# Patient Record
Sex: FEMALE | Race: WHITE | NOT HISPANIC OR LATINO | Employment: OTHER | URBAN - METROPOLITAN AREA
[De-identification: names, ages, dates, MRNs, and addresses within clinical notes are randomized per-mention and may not be internally consistent; named-entity substitution may affect disease eponyms.]

---

## 2017-04-06 ENCOUNTER — HISTORICAL (OUTPATIENT)
Dept: ADMINISTRATIVE | Facility: HOSPITAL | Age: 67
End: 2017-04-06

## 2017-04-25 LAB
ALBUMIN SERPL-MCNC: 3.9 G/DL (ref 3.1–4.7)
ALP SERPL-CCNC: 127 IU/L (ref 40–104)
ALT (SGPT): 105 IU/L (ref 3–33)
AST SERPL-CCNC: 61 IU/L (ref 10–40)
BILIRUB SERPL-MCNC: 0.4 MG/DL (ref 0.3–1)
BILIRUBIN DIRECT+TOT PNL SERPL-MCNC: <0.1 MG/DL (ref 0–0.2)
HBA1C MFR BLD: 5.9 % (ref 3.1–6.5)
PROT SERPL-MCNC: 7.6 G/DL (ref 6–8.2)
T4 FREE SP9 P CHAL SERPL-SCNC: 0.59 NG/DL (ref 0.45–1.27)
TSH SERPL DL<=0.005 MIU/L-ACNC: 4.74 ULU/ML (ref 0.3–5.6)

## 2017-09-26 RX ORDER — METOPROLOL SUCCINATE 25 MG/1
1 TABLET, EXTENDED RELEASE ORAL DAILY
COMMUNITY
End: 2019-01-21

## 2017-09-26 RX ORDER — ASPIRIN 325 MG
1 TABLET, DELAYED RELEASE (ENTERIC COATED) ORAL DAILY
COMMUNITY

## 2017-09-26 RX ORDER — TELMISARTAN 40 MG/1
1 TABLET ORAL DAILY
COMMUNITY
End: 2017-09-27 | Stop reason: ALTCHOICE

## 2017-09-26 RX ORDER — CETIRIZINE HYDROCHLORIDE 10 MG/1
1 TABLET ORAL DAILY
COMMUNITY
Start: 2017-03-30

## 2017-09-26 RX ORDER — OXYMETAZOLINE HCL 0.05 %
2 SPRAY, NON-AEROSOL (ML) NASAL DAILY
COMMUNITY
Start: 2017-03-30 | End: 2018-03-27

## 2017-09-27 ENCOUNTER — OFFICE VISIT (OUTPATIENT)
Dept: FAMILY MEDICINE | Facility: CLINIC | Age: 67
End: 2017-09-27
Payer: MEDICARE

## 2017-09-27 VITALS
HEIGHT: 60 IN | DIASTOLIC BLOOD PRESSURE: 63 MMHG | HEART RATE: 54 BPM | BODY MASS INDEX: 35.93 KG/M2 | SYSTOLIC BLOOD PRESSURE: 125 MMHG | OXYGEN SATURATION: 96 % | WEIGHT: 183 LBS | RESPIRATION RATE: 18 BRPM

## 2017-09-27 DIAGNOSIS — R53.83 FATIGUE, UNSPECIFIED TYPE: ICD-10-CM

## 2017-09-27 DIAGNOSIS — K76.0 STEATOSIS OF LIVER: ICD-10-CM

## 2017-09-27 DIAGNOSIS — E11.9 TYPE 2 DIABETES MELLITUS WITHOUT COMPLICATION, WITHOUT LONG-TERM CURRENT USE OF INSULIN: ICD-10-CM

## 2017-09-27 DIAGNOSIS — Z12.11 COLON CANCER SCREENING: ICD-10-CM

## 2017-09-27 DIAGNOSIS — I10 ESSENTIAL HYPERTENSION: Primary | ICD-10-CM

## 2017-09-27 PROBLEM — E78.5 HYPERLIPIDEMIA: Status: ACTIVE | Noted: 2017-09-27

## 2017-09-27 PROBLEM — J30.2 SEASONAL ALLERGIC RHINITIS: Status: ACTIVE | Noted: 2017-09-27

## 2017-09-27 PROBLEM — Z95.1 HISTORY OF CORONARY ARTERY BYPASS SURGERY: Status: ACTIVE | Noted: 2017-09-27

## 2017-09-27 PROBLEM — K80.20 CALCULUS OF GALLBLADDER: Status: ACTIVE | Noted: 2017-09-27

## 2017-09-27 PROBLEM — E66.9 OBESITY WITH BODY MASS INDEX 30 OR GREATER: Status: ACTIVE | Noted: 2017-09-27

## 2017-09-27 PROBLEM — L23.7 CONTACT DERMATITIS DUE TO POISON IVY: Status: ACTIVE | Noted: 2017-09-27

## 2017-09-27 PROBLEM — R73.09 ELEVATED HEMOGLOBIN A1C: Status: ACTIVE | Noted: 2017-09-27

## 2017-09-27 PROBLEM — H10.10 ALLERGIC CONJUNCTIVITIS: Status: ACTIVE | Noted: 2017-09-27

## 2017-09-27 PROBLEM — R79.89 ABNORMAL LIVER FUNCTION TESTS: Status: ACTIVE | Noted: 2017-09-27

## 2017-09-27 PROCEDURE — 99214 OFFICE O/P EST MOD 30 MIN: CPT | Mod: ,,, | Performed by: INTERNAL MEDICINE

## 2017-09-27 PROCEDURE — 1126F AMNT PAIN NOTED NONE PRSNT: CPT | Mod: ,,, | Performed by: INTERNAL MEDICINE

## 2017-09-27 PROCEDURE — 1159F MED LIST DOCD IN RCRD: CPT | Mod: ,,, | Performed by: INTERNAL MEDICINE

## 2017-09-27 RX ORDER — LOSARTAN POTASSIUM 25 MG/1
25 TABLET ORAL DAILY
COMMUNITY
End: 2019-01-21

## 2017-09-27 NOTE — PROGRESS NOTES
Subjective:       Patient ID: Elena Wells is a 67 y.o. female.    Chief Complaint: Follow-up (6 mon f/u?)    Patient is here for follow-up for her borderline diabetes, fatty liver, and hypertension. She has a history of CABG. She is complaining of her pulse rate going low and being symptomatic. It doesn't happen often and she is interested in decreasing her Toprol or possibly getting rid of it. She will see her cardiologist next month and he A her to have a CMP as well as a lipid profile. We will add to that hemoglobin A1c as well as a CBC and TSH.    Patient is interested in weight loss and we discussed an exercise program for her and some diet tips. She knows if she loses some weight around her abdominal area that her liver enzymes will improve.      Review of Systems   Constitutional: Negative for activity change, appetite change, chills, diaphoresis, fatigue, fever and unexpected weight change.   HENT: Negative for congestion, ear pain, mouth sores, postnasal drip, sinus pressure, sore throat and trouble swallowing.    Eyes: Negative for pain, redness and visual disturbance.   Respiratory: Negative for apnea, cough, chest tightness, shortness of breath and wheezing.    Cardiovascular: Negative for chest pain, palpitations and leg swelling.   Gastrointestinal: Negative for abdominal distention, abdominal pain, blood in stool, constipation, diarrhea, nausea and vomiting.   Endocrine: Negative for cold intolerance, polydipsia, polyphagia and polyuria.   Genitourinary: Negative for difficulty urinating, dysuria, flank pain, frequency, hematuria, menstrual problem, pelvic pain and urgency.   Musculoskeletal: Negative for arthralgias, back pain, joint swelling and neck pain.   Skin: Negative for color change, rash and wound.   Neurological: Negative for dizziness, tremors, seizures, syncope, weakness, light-headedness, numbness and headaches.   Hematological: Negative for adenopathy. Does not bruise/bleed  easily.   Psychiatric/Behavioral: Negative for confusion, decreased concentration, dysphoric mood, hallucinations, self-injury, sleep disturbance and suicidal ideas. The patient is not nervous/anxious.        Past Medical History:   Diagnosis Date    Allergy     Diabetes mellitus, type 2        Past Surgical History:   Procedure Laterality Date    APPENDECTOMY      CARDIAC SURGERY      HYSTERECTOMY         Family History   Problem Relation Age of Onset    Diabetes Mother     Arthritis Mother     Hypertension Mother     Cancer Father     COPD Father        Social History     Social History    Marital status:      Spouse name: N/A    Number of children: N/A    Years of education: N/A     Social History Main Topics    Smoking status: Never Smoker    Smokeless tobacco: Never Used    Alcohol use No    Drug use: No    Sexual activity: No     Other Topics Concern    None     Social History Narrative    None       Current Outpatient Prescriptions   Medication Sig Dispense Refill    aspirin (ECOTRIN) 325 MG EC tablet Take 1 tablet by mouth once daily.      evolocumab (REPATHA SURECLICK) 140 mg/mL PnIj Inject 1 Dose into the skin every 14 (fourteen) days.      losartan (COZAAR) 25 MG tablet Take 25 mg by mouth once daily.      metoprolol succinate (TOPROL-XL) 25 MG 24 hr tablet Take 1 tablet by mouth once daily.      oxymetazoline (12 HOUR NASAL SPRAY) 0.05 % nasal spray 2 sprays by Nasal route once daily.      cetirizine (ZYRTEC) 10 MG tablet Take 1 tablet by mouth once daily.       No current facility-administered medications for this visit.        Review of patient's allergies indicates:  No Known Allergies  Objective:      Physical Exam   Constitutional: She is oriented to person, place, and time. She appears well-developed and well-nourished.   HENT:   Head: Normocephalic and atraumatic.   Eyes: EOM are normal. Pupils are equal, round, and reactive to light.   Neck: Neck supple. No JVD  present.   Cardiovascular: Normal rate and regular rhythm.  Exam reveals no gallop and no friction rub.    No murmur heard.  Pulmonary/Chest: Effort normal and breath sounds normal. She has no wheezes. She has no rales.   Abdominal: Soft. Bowel sounds are normal. She exhibits no distension. There is no tenderness.   Musculoskeletal: She exhibits no edema or tenderness.   Lymphadenopathy:     She has no cervical adenopathy.   Neurological: She is alert and oriented to person, place, and time. She has normal reflexes. No cranial nerve deficit.   Skin: No rash noted. No erythema.   Psychiatric: She has a normal mood and affect.   Nursing note and vitals reviewed.      Assessment:       1. Essential hypertension    2. Type 2 diabetes mellitus without complication, without long-term current use of insulin    3. Steatosis of liver    4. Colon cancer screening    5. Fatigue, unspecified type        Plan:       Essential hypertension  -     TSH; Future; Expected date: 09/27/2017  -     T4, free; Future; Expected date: 09/27/2017    Type 2 diabetes mellitus without complication, without long-term current use of insulin-diet controlled  -     Hemoglobin A1c; Future; Expected date: 09/27/2017    Steatosis of liver-followed by dr easley     Colon cancer screening-will be done in the near future- her first one    Fatigue, unspecified type  -     CBC auto differential; Future; Expected date: 09/27/2017

## 2017-09-27 NOTE — PATIENT INSTRUCTIONS
Understanding Carbohydrates, Fats, and Protein  Food is a source of fuel and nourishment for your body. Its also a source of pleasure. Having diabetes doesnt mean you have to eat special foods or give up desserts. Instead, your dietitian can show you how to plan meals to suit your body. To start, learn how different foods affect blood sugar.  Carbohydrates  Carbohydrates are the main source of fuel for the body. Carbohydrates raise blood sugar. Many people think carbohydrates are only found in pasta or bread. But carbohydrates are actually in many kinds of foods:  · Sugars occur naturally in foods such as fruit, milk, honey, and molasses. Sugars can also be added to many foods, from cereals and yogurt to candy and desserts. Sugars raise blood sugar.  · Starches are found in bread, cereals, pasta, and dried beans. Theyre also found in corn, peas, potatoes, yam, acorn squash, and butternut squash. Starches also raise blood sugar.   · Fiber is found in foods such as vegetables, fruits, beans, and whole grains. Unlike other carbs, fiber isnt digested or absorbed. So it doesnt raise blood sugar. In fact, fiber can help keep blood sugar from rising too fast. It also helps keep blood cholesterol at a healthy level.  Did you know?  Even though carbohydrates raise blood sugar, its best to have some in every meal. They are an important part of a healthy diet.   Fat  Fat is an energy source that can be stored until needed. Fat does not raise blood sugar. However, it can raise blood cholesterol, increasing the risk of heart disease. Fat is also high in calories, which can cause weight gain. Not all types of fat are the same.  More Healthy:  · Monounsaturated fats are mostly found in vegetable oils, such as olive, canola, and peanut oils. They are also found in avocados and some nuts. Monounsaturated fats are healthy for your heart. Thats because they lower LDL (unhealthy) cholesterol.  · Polyunsaturated fats are mostly  found in vegetable oils, such as corn, safflower, and soybean oils. They are also found in some seeds, nuts, and fish. Polyunsaturated fats lower LDL (unhealthy) cholesterol. So, choosing them instead of saturated fats is healthy for your heart. Certain unsaturated fats can help lower triglycerides.   Less Healthy:  · Saturated fats are found in animal products, such as meat, poultry, whole milk, lard, and butter. Saturated fats raise LDL cholesterol and are not healthy for your heart.  · Hydrogenated oils and trans fats are formed when vegetable oils are processed into solid fats. They are found in many processed foods. Hydrogenated oils and trans fats raise LDL cholesterol and lower HDL (healthy) cholesterol. They are not healthy for your heart.  Protein  Protein helps the body build and repair muscle and other tissue. Protein has little or no effect on blood sugar. However, many foods that contain protein also contain saturated fat. By choosing low-fat protein sources, you can get the benefits of protein without the extra fat:  · Plant protein is found in dry beans and peas, nuts, and soy products, such as tofu and soymilk. These sources tend to be cholesterol-free and low in saturated fat.  · Animal protein is found in fish, poultry, meat, cheese, milk, and eggs. These contain cholesterol and can be high in saturated fat. Aim for lean, lower-fat choices.  Date Last Reviewed: 3/1/2016  © 9734-5474 [employer]. [address] [place] [address], [place] [address]. All rights reserved. This information is not intended as a substitute for professional medical care. Always follow your healthcare professional's instructions.

## 2017-10-18 LAB
ALBUMIN SERPL-MCNC: 3.9 G/DL (ref 3.1–4.7)
ALP SERPL-CCNC: 123 IU/L (ref 40–104)
ALT (SGPT): 93 IU/L (ref 3–33)
AST SERPL-CCNC: 65 IU/L (ref 10–40)
BASOPHILS NFR BLD: 0.1 K/UL (ref 0–0.2)
BASOPHILS NFR BLD: 0.9 %
BILIRUB SERPL-MCNC: 0.5 MG/DL (ref 0.3–1)
BUN SERPL-MCNC: 21 MG/DL (ref 8–20)
CALCIUM SERPL-MCNC: 9.3 MG/DL (ref 7.7–10.4)
CHLORIDE: 104 MMOL/L (ref 98–110)
CO2 SERPL-SCNC: 26.1 MMOL/L (ref 22.8–31.6)
CREATININE: 1.25 MG/DL (ref 0.6–1.4)
EOSINOPHIL NFR BLD: 0.2 K/UL (ref 0–0.7)
EOSINOPHIL NFR BLD: 1.8 %
ERYTHROCYTE [DISTWIDTH] IN BLOOD BY AUTOMATED COUNT: 13.3 % (ref 11.7–14.9)
GLUCOSE: 103 MG/DL (ref 70–99)
GRAN #: 4.4 K/UL (ref 1.4–6.5)
GRAN%: 53.3 %
HBA1C MFR BLD: 5.9 % (ref 3.1–6.5)
HCT VFR BLD AUTO: 41.3 % (ref 36–48)
HGB BLD-MCNC: 13.4 G/DL (ref 12–15)
IMMATURE GRANS (ABS): 0 K/UL (ref 0–1)
IMMATURE GRANULOCYTES: 0.2 %
LYMPH #: 3 K/UL (ref 1.2–3.4)
LYMPH%: 37.1 %
MCH RBC QN AUTO: 30.2 PG (ref 25–35)
MCHC RBC AUTO-ENTMCNC: 32.4 G/DL (ref 31–36)
MCV RBC AUTO: 93 FL (ref 79–98)
MONO #: 0.6 K/UL (ref 0.1–0.6)
MONO%: 6.7 %
NUCLEATED RBCS: 0 %
PLATELET # BLD AUTO: 267 K/UL (ref 140–440)
PMV BLD AUTO: 11.8 FL (ref 8.8–12.7)
POTASSIUM SERPL-SCNC: 4.2 MMOL/L (ref 3.5–5)
PROT SERPL-MCNC: 7.6 G/DL (ref 6–8.2)
RBC # BLD AUTO: 4.44 M/UL (ref 3.5–5.5)
SODIUM: 140 MMOL/L (ref 134–144)
T4 FREE SP9 P CHAL SERPL-SCNC: 0.48 NG/DL (ref 0.45–1.27)
TSH SERPL DL<=0.005 MIU/L-ACNC: 6.26 ULU/ML (ref 0.3–5.6)
WBC # BLD AUTO: 8.2 K/UL (ref 5–10)

## 2017-10-19 DIAGNOSIS — E03.9 HYPOTHYROIDISM, UNSPECIFIED TYPE: Primary | ICD-10-CM

## 2017-10-19 RX ORDER — LEVOTHYROXINE SODIUM 25 UG/1
25 TABLET ORAL DAILY
Qty: 30 TABLET | Refills: 11 | Status: SHIPPED | OUTPATIENT
Start: 2017-10-19 | End: 2018-10-19 | Stop reason: SDUPTHER

## 2017-10-20 ENCOUNTER — TELEPHONE (OUTPATIENT)
Dept: FAMILY MEDICINE | Facility: CLINIC | Age: 67
End: 2017-10-20

## 2017-10-20 NOTE — TELEPHONE ENCOUNTER
----- Message from Adry Mccarthy MD sent at 10/19/2017  8:20 AM CDT -----  All her labs looked good except of course her liver enzymes were elevated but stable AND she is starting to become low on thyroid - I will escribe a supplement and repeat labs in two months for the thyroid.  It needs to be taken on an empty stomach and then not eat for 30 min

## 2017-10-20 NOTE — TELEPHONE ENCOUNTER
Notified pt. She got script from pharmacy & I mailed out the lab orders to her to do in 2 months. She verbalized understanding

## 2018-01-09 LAB
T4 FREE SP9 P CHAL SERPL-SCNC: 0.66 NG/DL (ref 0.45–1.27)
TSH SERPL DL<=0.005 MIU/L-ACNC: 3.65 ULU/ML (ref 0.3–5.6)

## 2018-01-18 ENCOUNTER — TELEPHONE (OUTPATIENT)
Dept: FAMILY MEDICINE | Facility: CLINIC | Age: 68
End: 2018-01-18

## 2018-01-18 NOTE — TELEPHONE ENCOUNTER
----- Message from Adry Mccarthy MD sent at 1/10/2018  7:06 AM CST -----  Thyroid looks good him continue the same supplemental dose. We'll see her in a few months.

## 2018-03-27 ENCOUNTER — OFFICE VISIT (OUTPATIENT)
Dept: FAMILY MEDICINE | Facility: CLINIC | Age: 68
End: 2018-03-27
Payer: MEDICARE

## 2018-03-27 VITALS
HEIGHT: 60 IN | HEART RATE: 96 BPM | OXYGEN SATURATION: 96 % | SYSTOLIC BLOOD PRESSURE: 138 MMHG | WEIGHT: 185.5 LBS | BODY MASS INDEX: 36.42 KG/M2 | RESPIRATION RATE: 16 BRPM | DIASTOLIC BLOOD PRESSURE: 78 MMHG

## 2018-03-27 DIAGNOSIS — K80.20 CALCULUS OF GALLBLADDER WITHOUT CHOLECYSTITIS WITHOUT OBSTRUCTION: ICD-10-CM

## 2018-03-27 DIAGNOSIS — Z79.4 TYPE 2 DIABETES MELLITUS WITHOUT COMPLICATION, WITH LONG-TERM CURRENT USE OF INSULIN: ICD-10-CM

## 2018-03-27 DIAGNOSIS — E03.9 ACQUIRED HYPOTHYROIDISM: ICD-10-CM

## 2018-03-27 DIAGNOSIS — I10 ESSENTIAL HYPERTENSION: Primary | ICD-10-CM

## 2018-03-27 DIAGNOSIS — E11.9 TYPE 2 DIABETES MELLITUS WITHOUT COMPLICATION, WITH LONG-TERM CURRENT USE OF INSULIN: ICD-10-CM

## 2018-03-27 DIAGNOSIS — Z95.1 HISTORY OF CORONARY ARTERY BYPASS SURGERY: ICD-10-CM

## 2018-03-27 DIAGNOSIS — E78.00 PURE HYPERCHOLESTEROLEMIA: ICD-10-CM

## 2018-03-27 DIAGNOSIS — Z12.11 COLON CANCER SCREENING: ICD-10-CM

## 2018-03-27 LAB — HBA1C MFR BLD: 6.1 %

## 2018-03-27 PROCEDURE — 99214 OFFICE O/P EST MOD 30 MIN: CPT | Mod: ,,, | Performed by: INTERNAL MEDICINE

## 2018-03-27 PROCEDURE — 83036 HEMOGLOBIN GLYCOSYLATED A1C: CPT | Mod: QW,,, | Performed by: INTERNAL MEDICINE

## 2018-03-27 RX ORDER — FLUTICASONE PROPIONATE 50 MCG
1 SPRAY, SUSPENSION (ML) NASAL DAILY
COMMUNITY
End: 2019-07-02

## 2018-03-27 NOTE — PATIENT INSTRUCTIONS
Possible Gallstone with Biliary Colic (Presumed)    Your abdominal pain is may be due to spasm of the gallbladder. This is called gallbladder or biliary colic. The gallbladder is a small sac under the liver, which stores and releases bile. Bile is a fluid that aids in the digestion of fat. Eating fatty food stimulates the gallbladder to contract, and release the bile. A gallstone may form in this sac. Although most people do not have symptoms, when the stone moves and blocks the passage of bile out of the bladder, it can cause pain and even an infection.  To be more certain of the diagnosis, you may need to have an ultrasound, CT-scan or other special test.  A number of things increase the risk for developing gallstones:  · Women are more likely  · Obesity  · Increasing age  · Losing or gaining weight quickly  · High calorie diet  · Pregnancy  · Hormone therapy  · Diabetes  The most common symptoms are:  · Abdominal pain, cramping, aching  · Nausea, vomiting  · Fever  Many illnesses can cause these symptoms. This pain usually starts in the upper right side of your abdomen. Sometimes it can radiate to your right shoulder, back and arm. It usually starts suddenly, becomes more intense quickly, and then gradually decreases and disappears over a couple of hours. Elderly people and diabetics may have difficulty showing where the pain is exactly.  Home care  · Rest in bed and follow a clear liquid diet until feeling better. If pain or nausea medicine was given to help with your symptoms, take these as directed.  · You can take acetaminophen or ibuprofen for pain, unless you were given a different pain medicine to use.Note: If you have chronic liver or kidney disease or ever had a stomach ulcer or GI bleeding or are taking blood thinner medications, talk with your doctor before using these medicines.  · Fat in your diet makes the gallbladder contract and may cause increased pain. Therefore, avoid fat in your diet over  the next two days and follow a low-fat diet after that. If you are overweight, a low fat diet will help you lose weight.  Follow-up care  If a test was already scheduled for you, keep this appointment. Be sure you know how to prepare yourself for the test. Usually, you will be asked not to eat or drink anything for at least 8 hours before the test. Schedule an appointment with your own doctor after your test is complete to discuss the findings.  When to seek medical advice  Call your healthcare provider if any of the following occur:  · Pain gets worse or moves to the right lower abdomen  · Repeated vomiting  · Swelling of the abdomen  · Pain lasts over 6 hours  · Fever of 100.4º F (38º C) or higher, or as directed by your healthcare provider  · Weakness, dizziness  · Dark urine or light colored stools  · Yellow color of the skin or eyes  · Chest, arm, back, neck or jaw pain  Call 911  Get emergency medical care right away if any of these occur:  · Trouble breathing  · Very confused  · Very drowsy or trouble awakening  · Fainting or loss of consciousness  · Rapid heart rate  Date Last Reviewed: 8/23/2015  © 2054-7144 [employer]. [address] [place] [address], [place] [address]. All rights reserved. This information is not intended as a substitute for professional medical care. Always follow your healthcare professional's instructions.

## 2018-03-27 NOTE — PROGRESS NOTES
Subjective:       Patient ID: Elena Wells is a 67 y.o. female.    Chief Complaint: Follow-up (6 month f/u)    67-year-old female who is here for four-month follow-up for hypertension, hypothyroidism as well as nonalcoholic fatty liver disease which she's had a whole workup including a liver biopsy. We have discussed the new medication,Hepaxa and she is interested in it. She also has diet-controlled diabetes and hesitant to go on metformin to her fatty liver disease. She does not have a family history of colon cancer nor personal history of colon polyps in his interest in the color guard DNAs to test. On recent imaging she had gallstones and these are present on prior imaging test as well and we did discuss the symptoms of symptomatic gallstones and if she has an attack and with her having diabetes she most likely should get a laparoscopic cholecystectomy.      Review of Systems   Constitutional: Negative for activity change, appetite change, chills, diaphoresis, fatigue, fever and unexpected weight change.   HENT: Negative for congestion, ear pain, mouth sores, postnasal drip, sinus pressure, sore throat and trouble swallowing.    Eyes: Negative for pain, redness and visual disturbance.   Respiratory: Negative for apnea, cough, chest tightness, shortness of breath and wheezing.    Cardiovascular: Negative for chest pain, palpitations and leg swelling.   Gastrointestinal: Negative for abdominal distention, abdominal pain, blood in stool, constipation, diarrhea, nausea and vomiting.   Endocrine: Negative for cold intolerance, polydipsia, polyphagia and polyuria.   Genitourinary: Negative for difficulty urinating, dysuria, flank pain, frequency, hematuria, menstrual problem, pelvic pain and urgency.   Musculoskeletal: Negative for arthralgias, back pain, joint swelling and neck pain.   Skin: Negative for color change, rash and wound.   Neurological: Negative for dizziness, tremors, seizures, syncope, weakness,  light-headedness, numbness and headaches.   Hematological: Negative for adenopathy. Does not bruise/bleed easily.   Psychiatric/Behavioral: Negative for confusion, decreased concentration, dysphoric mood, hallucinations, self-injury, sleep disturbance and suicidal ideas. The patient is not nervous/anxious.        Past Medical History:   Diagnosis Date    Allergy     Diabetes mellitus, type 2        Past Surgical History:   Procedure Laterality Date    APPENDECTOMY      CARDIAC SURGERY      HYSTERECTOMY         Family History   Problem Relation Age of Onset    Diabetes Mother     Arthritis Mother     Hypertension Mother     Cancer Father     COPD Father        Social History     Social History    Marital status:      Spouse name: N/A    Number of children: N/A    Years of education: N/A     Social History Main Topics    Smoking status: Never Smoker    Smokeless tobacco: Never Used    Alcohol use No    Drug use: No    Sexual activity: No     Other Topics Concern    None     Social History Narrative    None       Current Outpatient Prescriptions   Medication Sig Dispense Refill    aspirin (ECOTRIN) 325 MG EC tablet Take 1 tablet by mouth once daily.      evolocumab (REPATHA SURECLICK) 140 mg/mL PnIj Inject 1 Dose into the skin every 14 (fourteen) days.      fluticasone (FLONASE) 50 mcg/actuation nasal spray 1 spray by Each Nare route once daily.      levothyroxine (SYNTHROID) 25 MCG tablet Take 1 tablet (25 mcg total) by mouth once daily. 30 tablet 11    losartan (COZAAR) 25 MG tablet Take 25 mg by mouth once daily.      metoprolol succinate (TOPROL-XL) 25 MG 24 hr tablet Take 1 tablet by mouth once daily.      cetirizine (ZYRTEC) 10 MG tablet Take 1 tablet by mouth once daily.       No current facility-administered medications for this visit.        Review of patient's allergies indicates:  No Known Allergies  Objective:      Physical Exam   Constitutional: She is oriented to person,  place, and time. She appears well-developed and well-nourished.   HENT:   Head: Normocephalic and atraumatic.   Eyes: Right eye exhibits no discharge. Left eye exhibits no discharge. No scleral icterus.   Neck: Neck supple. No JVD present.   Cardiovascular: Normal rate and regular rhythm.  Exam reveals no gallop and no friction rub.    No murmur heard.  Pulmonary/Chest: Effort normal and breath sounds normal. She has no wheezes. She has no rales.   Abdominal: Soft. Bowel sounds are normal. She exhibits no distension. There is no tenderness.   Musculoskeletal: She exhibits no edema or tenderness.   Lymphadenopathy:     She has no cervical adenopathy.   Neurological: She is alert and oriented to person, place, and time. She has normal reflexes. No cranial nerve deficit.   Skin: No rash noted. No erythema.   Psychiatric: She has a normal mood and affect.   Nursing note and vitals reviewed.      Lab Results   Component Value Date    WBC 8.2 10/18/2017    HGB 13.4 10/18/2017    HCT 41.3 10/18/2017     10/18/2017    AST 65 (H) 10/18/2017     10/18/2017    K 4.2 10/18/2017     10/18/2017    CREATININE 1.25 10/18/2017    BUN 21 (H) 10/18/2017    CO2 26.1 10/18/2017    TSH 3.65 01/09/2018    HGBA1C 6.1 03/27/2018     Assessment:       1. Essential hypertension    2. Pure hypercholesterolemia    3. Type 2 diabetes mellitus without complication, with long-term current use of insulin    4. Acquired hypothyroidism    5. Calculus of gallbladder without cholecystitis without obstruction    6. History of coronary artery bypass surgery    7. Colon cancer screening        Plan:       Essential hypertension-continue cozaar and toprol    Pure hypercholesterolemia on repatha    Type 2 diabetes mellitus without complication, with long-term current use of insulin  -     Hemoglobin A1C, POCT    Acquired hypothyroidism  -     T4, free; Future; Expected date: 03/27/2018  -     TSH; Future; Expected date:  03/27/2018    Calculus of gallbladder without cholecystitis without obstruction - reviewed symptoms for an acute biliary attack    Colon cancer screening-cologuard    Time spent with the patient was 40 min and 50 percent of that was in face to face contact

## 2018-04-18 LAB
ALBUMIN SERPL-MCNC: 3.9 G/DL (ref 3.1–4.7)
ALP SERPL-CCNC: 143 IU/L (ref 40–104)
ALT (SGPT): 150 IU/L (ref 3–33)
AST SERPL-CCNC: 93 IU/L (ref 10–40)
BILIRUB SERPL-MCNC: 0.7 MG/DL (ref 0.3–1)
BUN SERPL-MCNC: 22 MG/DL (ref 8–20)
CALCIUM SERPL-MCNC: 9.3 MG/DL (ref 7.7–10.4)
CHLORIDE: 105 MMOL/L (ref 98–110)
CO2 SERPL-SCNC: 26.7 MMOL/L (ref 22.8–31.6)
CREATININE: 1.32 MG/DL (ref 0.6–1.4)
GLUCOSE: 107 MG/DL (ref 70–99)
POTASSIUM SERPL-SCNC: 4.4 MMOL/L (ref 3.5–5)
PROT SERPL-MCNC: 8.1 G/DL (ref 6–8.2)
SODIUM: 138 MMOL/L (ref 134–144)
T4 FREE SP9 P CHAL SERPL-SCNC: 0.61 NG/DL (ref 0.45–1.27)
TSH SERPL DL<=0.005 MIU/L-ACNC: 3.65 ULU/ML (ref 0.3–5.6)

## 2018-04-24 DIAGNOSIS — K75.81 NASH (NONALCOHOLIC STEATOHEPATITIS): ICD-10-CM

## 2018-04-24 DIAGNOSIS — N28.9 ACUTE RENAL INSUFFICIENCY: Primary | ICD-10-CM

## 2018-04-24 DIAGNOSIS — E11.9 TYPE 2 DIABETES MELLITUS WITHOUT COMPLICATION, WITHOUT LONG-TERM CURRENT USE OF INSULIN: ICD-10-CM

## 2018-04-30 ENCOUNTER — TELEPHONE (OUTPATIENT)
Dept: FAMILY MEDICINE | Facility: CLINIC | Age: 68
End: 2018-04-30

## 2018-04-30 NOTE — TELEPHONE ENCOUNTER
----- Message from Adry Mccarthy MD sent at 4/24/2018 12:29 PM CDT -----  Labs back and her kidney function is a little more compromised than last lab work. Her liver enzymes are a little higher as well but I am not so concerned with this     Let us recheck kidney function, non fasting with hydration, recheck her Aic and check for protein in her urine at the end of June and have her make an ov for the beginning of July to review

## 2018-04-30 NOTE — TELEPHONE ENCOUNTER
Pt notified. Labs faxed to [facility] for pt to do labs in June.

## 2018-06-28 ENCOUNTER — TELEPHONE (OUTPATIENT)
Dept: FAMILY MEDICINE | Facility: CLINIC | Age: 68
End: 2018-06-28

## 2018-06-28 NOTE — TELEPHONE ENCOUNTER
----- Message from Shagufta Edmonds sent at 6/28/2018  8:44 AM CDT -----  Can you please request eye exam? Thanks!!

## 2018-06-28 NOTE — TELEPHONE ENCOUNTER
Attempted to notify pt she is ovedue for Eye exam. No answer, LVM to call back and let us know if she got one and where so we can request records.  If not we can give her a referral at her 7/18 appt.

## 2018-07-03 LAB
ALBUMIN SERPL-MCNC: 3.6 G/DL (ref 3.1–4.7)
ALP SERPL-CCNC: 125 IU/L (ref 40–104)
ALT (SGPT): 79 IU/L (ref 3–33)
AST SERPL-CCNC: 55 IU/L (ref 10–40)
BASOPHILS NFR BLD: 0.1 K/UL (ref 0–0.2)
BASOPHILS NFR BLD: 0.9 %
BILIRUB SERPL-MCNC: 0.7 MG/DL (ref 0.3–1)
BUN SERPL-MCNC: 18 MG/DL (ref 8–20)
CALCIUM SERPL-MCNC: 9.2 MG/DL (ref 7.7–10.4)
CHLORIDE: 106 MMOL/L (ref 98–110)
CO2 SERPL-SCNC: 25.8 MMOL/L (ref 22.8–31.6)
CREATININE RANDOM URINE: 103 MG/DL
CREATININE: 1.12 MG/DL (ref 0.6–1.4)
EOSINOPHIL NFR BLD: 0.2 K/UL (ref 0–0.7)
EOSINOPHIL NFR BLD: 2.2 %
ERYTHROCYTE [DISTWIDTH] IN BLOOD BY AUTOMATED COUNT: 13.3 % (ref 11.7–14.9)
GLUCOSE: 104 MG/DL (ref 70–99)
GRAN #: 3.6 K/UL (ref 1.4–6.5)
GRAN%: 48.9 %
HBA1C MFR BLD: 6.1 % (ref 3.1–6.5)
HCT VFR BLD AUTO: 42.5 % (ref 36–48)
HGB BLD-MCNC: 13.9 G/DL (ref 12–15)
IMMATURE GRANS (ABS): 0 K/UL (ref 0–1)
IMMATURE GRANULOCYTES: 0.3 %
LYMPH #: 3 K/UL (ref 1.2–3.4)
LYMPH%: 40 %
MCH RBC QN AUTO: 30.6 PG (ref 25–35)
MCHC RBC AUTO-ENTMCNC: 32.7 G/DL (ref 31–36)
MCV RBC AUTO: 93.6 FL (ref 79–98)
MICROALBUM.,U,RANDOM: 3.5 MCG/ML (ref 0–19.9)
MICROALBUMIN/CREATININE RATIO: 3 (ref 0–30)
MONO #: 0.6 K/UL (ref 0.1–0.6)
MONO%: 7.7 %
NUCLEATED RBCS: 0 %
PLATELET # BLD AUTO: 255 K/UL (ref 140–440)
PMV BLD AUTO: 11.7 FL (ref 8.8–12.7)
POTASSIUM SERPL-SCNC: 4.4 MMOL/L (ref 3.5–5)
PROT SERPL-MCNC: 7.7 G/DL (ref 6–8.2)
RBC # BLD AUTO: 4.54 M/UL (ref 3.5–5.5)
SODIUM: 138 MMOL/L (ref 134–144)
WBC # BLD AUTO: 7.4 K/UL (ref 5–10)

## 2018-07-09 ENCOUNTER — TELEPHONE (OUTPATIENT)
Dept: FAMILY MEDICINE | Facility: CLINIC | Age: 68
End: 2018-07-09

## 2018-07-09 RX ORDER — CIPROFLOXACIN 500 MG/1
500 TABLET ORAL 2 TIMES DAILY
Qty: 10 TABLET | Refills: 0 | Status: SHIPPED | OUTPATIENT
Start: 2018-07-09 | End: 2019-01-21

## 2018-07-09 NOTE — TELEPHONE ENCOUNTER
----- Message from Adry Mccarthy MD sent at 7/9/2018  7:03 AM CDT -----  She has the beginnings of a UTI- all the rest of her lab work looks good/stable     I will escribe an abx

## 2018-07-18 ENCOUNTER — OFFICE VISIT (OUTPATIENT)
Dept: FAMILY MEDICINE | Facility: CLINIC | Age: 68
End: 2018-07-18
Payer: MEDICARE

## 2018-07-18 VITALS
OXYGEN SATURATION: 96 % | DIASTOLIC BLOOD PRESSURE: 80 MMHG | WEIGHT: 184.31 LBS | HEART RATE: 57 BPM | BODY MASS INDEX: 36.18 KG/M2 | HEIGHT: 60 IN | SYSTOLIC BLOOD PRESSURE: 138 MMHG | RESPIRATION RATE: 16 BRPM

## 2018-07-18 DIAGNOSIS — K75.81 NASH (NONALCOHOLIC STEATOHEPATITIS): ICD-10-CM

## 2018-07-18 DIAGNOSIS — E03.9 ACQUIRED HYPOTHYROIDISM: ICD-10-CM

## 2018-07-18 DIAGNOSIS — H25.9 SENILE CATARACT OF LEFT EYE, UNSPECIFIED AGE-RELATED CATARACT TYPE: ICD-10-CM

## 2018-07-18 DIAGNOSIS — I10 ESSENTIAL HYPERTENSION: Primary | ICD-10-CM

## 2018-07-18 PROCEDURE — 90732 PPSV23 VACC 2 YRS+ SUBQ/IM: CPT | Mod: ,,, | Performed by: INTERNAL MEDICINE

## 2018-07-18 PROCEDURE — G0009 ADMIN PNEUMOCOCCAL VACCINE: HCPCS | Mod: ,,, | Performed by: INTERNAL MEDICINE

## 2018-07-18 PROCEDURE — 99214 OFFICE O/P EST MOD 30 MIN: CPT | Mod: ,,, | Performed by: INTERNAL MEDICINE

## 2018-07-18 NOTE — PATIENT INSTRUCTIONS
Pneumococcal Vaccination  There are 2 pneumococcal vaccinations that protect people against pneumococcal disease.  Pneumococcal disease  Pneumococcal disease is caused by bacteria (Streptococcus pneumoniae). This germ is easily spread when someone with the bacteria coughs, sneezes, laughs, or talks. You can get pneumococcal disease more than once. This is because there are many different types (strains) of the bacteria. Some strains are also resistant to treatment with antibiotics.  There are different kinds of pneumococcal disease, depending on what part of the body is infected. They include:  · Pneumonia. Infection in the lungs.  · Meningitis. Infection of the covering of the brain and spinal cord.  · Otitis media. Infection of the middle ear.  · Bacteremia or septicemia. Infection in the blood.  Pneumococcal disease can be life-threatening, especially for people in high-risk groups. Each year, thousands of people die of this disease. Thousands more become seriously ill.  The vaccine    The pneumococcal vaccines are the best way to avoid pneumococcal disease. They are safe and effective. The vaccines are given as shots (injections). This can be done at your healthcare provider's office or a health clinic. Drugstores, senior centers, and workplaces often offer vaccinations, too. If you have questions, ask your healthcare provider.  The pneumococcal vaccines are recommended for:  · Persons 65 and older  · Infants  · People with chronic health problems (such as diabetes, chronic lung or heart disease, liver disease)  · People who have a cochlear implant  · People who have weakened immune systems  · People who live in nursing homes or other long-term care facilities  · People who smoke or have asthma  They are given:  · In a 4-dose series in infants  · One time in adults; some people need a second dose of one of the vaccines  Your healthcare provider can tell you more about the vaccines, whether you should get them,  and the number of shots you should get.  Date Last Reviewed: 11/1/2016  © 4196-1262 [employer]. [address] [place] [address], [place] [address]. All rights reserved. This information is not intended as a substitute for professional medical care. Always follow your healthcare professional's instructions.

## 2018-07-18 NOTE — PROGRESS NOTES
Subjective:       Patient ID: Elena Wells is a 68 y.o. female.    Chief Complaint: Follow-up (6 month f/u) and Hypertension    60-year-old female who has a past medical history for Parra, dyslipidemia with coronary artery disease, allergic rhinitis and hypertension who also has insulin resistance and acquired hypothyroidism. She is here for four-month checkup on her liver enzymes and she is on Hepaxa for almost 2 months now and her liver enzymes are almost cut in half. These would include her to transaminases and to some degree she saw a mild decrease in alkaline phosphatase. Patient also feels much better      Review of Systems   Constitutional: Negative for activity change, appetite change, chills, diaphoresis, fatigue, fever and unexpected weight change.   HENT: Negative for congestion, ear pain, mouth sores, postnasal drip, sinus pressure, sore throat and trouble swallowing.    Eyes: Negative for pain, redness and visual disturbance.   Respiratory: Negative for apnea, cough, chest tightness, shortness of breath and wheezing.    Cardiovascular: Negative for chest pain, palpitations and leg swelling.   Gastrointestinal: Negative for abdominal distention, abdominal pain, blood in stool, constipation, diarrhea, nausea and vomiting.   Endocrine: Negative for cold intolerance, polydipsia, polyphagia and polyuria.   Genitourinary: Negative for difficulty urinating, dysuria, flank pain, frequency, hematuria, menstrual problem, pelvic pain and urgency.   Musculoskeletal: Negative for arthralgias, back pain, joint swelling and neck pain.   Skin: Negative for color change, rash and wound.   Neurological: Negative for dizziness, tremors, seizures, syncope, weakness, light-headedness, numbness and headaches.   Hematological: Negative for adenopathy. Does not bruise/bleed easily.   Psychiatric/Behavioral: Negative for confusion, decreased concentration, dysphoric mood, hallucinations, self-injury, sleep disturbance  and suicidal ideas. The patient is not nervous/anxious.        Past Medical History:   Diagnosis Date    Allergy     Diabetes mellitus, type 2        Past Surgical History:   Procedure Laterality Date    APPENDECTOMY      CARDIAC SURGERY      HYSTERECTOMY         Family History   Problem Relation Age of Onset    Diabetes Mother     Arthritis Mother     Hypertension Mother     Cancer Father     COPD Father        Social History     Social History    Marital status:      Spouse name: N/A    Number of children: N/A    Years of education: N/A     Social History Main Topics    Smoking status: Never Smoker    Smokeless tobacco: Never Used    Alcohol use No    Drug use: No    Sexual activity: No     Other Topics Concern    None     Social History Narrative    None       Current Outpatient Prescriptions   Medication Sig Dispense Refill    aspirin (ECOTRIN) 325 MG EC tablet Take 1 tablet by mouth once daily.      cetirizine (ZYRTEC) 10 MG tablet Take 1 tablet by mouth once daily.      ciprofloxacin HCl (CIPRO) 500 MG tablet Take 1 tablet (500 mg total) by mouth 2 (two) times daily. 10 tablet 0    evolocumab (REPATHA SURECLICK) 140 mg/mL PnIj Inject 1 Dose into the skin every 14 (fourteen) days.      fluticasone (FLONASE) 50 mcg/actuation nasal spray 1 spray by Each Nare route once daily.      levothyroxine (SYNTHROID) 25 MCG tablet Take 1 tablet (25 mcg total) by mouth once daily. 30 tablet 11    losartan (COZAAR) 25 MG tablet Take 25 mg by mouth once daily.      metoprolol succinate (TOPROL-XL) 25 MG 24 hr tablet Take 1 tablet by mouth once daily.       No current facility-administered medications for this visit.        Review of patient's allergies indicates:  No Known Allergies  Objective:      Physical Exam   Constitutional: She is oriented to person, place, and time. She appears well-developed and well-nourished.   HENT:   Head: Normocephalic and atraumatic.   Eyes: Right eye exhibits  no discharge. Left eye exhibits no discharge. No scleral icterus.   Neck: Neck supple. No JVD present.   Cardiovascular: Normal rate and regular rhythm.  Exam reveals no gallop and no friction rub.    No murmur heard.  Pulmonary/Chest: Effort normal and breath sounds normal. She has no wheezes. She has no rales.   Abdominal: Soft. Bowel sounds are normal. She exhibits no distension. There is no tenderness.   Musculoskeletal: She exhibits no edema or tenderness.   Lymphadenopathy:     She has no cervical adenopathy.   Neurological: She is alert and oriented to person, place, and time. She has normal reflexes. No cranial nerve deficit.   Skin: No rash noted. No erythema.   Psychiatric: She has a normal mood and affect.   Nursing note and vitals reviewed.      Lab Results   Component Value Date    WBC 7.4 07/03/2018    HGB 13.9 07/03/2018    HCT 42.5 07/03/2018     07/03/2018    AST 55 (H) 07/03/2018     07/03/2018    K 4.4 07/03/2018     07/03/2018    CREATININE 1.12 07/03/2018    BUN 18 07/03/2018    CO2 25.8 07/03/2018    TSH 3.65 04/18/2018    HGBA1C 6.1 07/03/2018     Assessment:       1. Essential hypertension    2. WATERS (nonalcoholic steatohepatitis)    3. Acquired hypothyroidism    4. Senile cataract of left eye, unspecified age-related cataract type    5. BMI 36.0-36.9,adult        Plan:       Essential hypertension-Controlled on above med regimen to include an ARB/ACE    WATERS (nonalcoholic steatohepatitis)-improved on hepaxa  -     Comprehensive metabolic panel; Future; Expected date: 11/12/2018    Acquired hypothyroidism  -     TSH; Future; Expected date: 11/12/2018  -     T4, free; Future; Expected date: 11/12/2018    Senile cataract of left eye, unspecified age-related cataract type  -     Ambulatory referral to Ophthalmology    BMI 36.0-36.9,adult-trial of adiposano    Other orders  -     Pneumococcal Polysaccharide Vaccine (23 Valent) (SQ/IM)

## 2018-07-25 ENCOUNTER — TELEPHONE (OUTPATIENT)
Dept: FAMILY MEDICINE | Facility: CLINIC | Age: 68
End: 2018-07-25

## 2018-10-04 ENCOUNTER — TELEPHONE (OUTPATIENT)
Dept: FAMILY MEDICINE | Facility: CLINIC | Age: 68
End: 2018-10-04

## 2018-10-04 NOTE — TELEPHONE ENCOUNTER
----- Message from Shagufta Edmonds sent at 10/4/2018  3:15 PM CDT -----  Can you please request eye exam from Dr. Cerrato office? Thanks!!

## 2018-10-19 DIAGNOSIS — E03.9 HYPOTHYROIDISM, UNSPECIFIED TYPE: ICD-10-CM

## 2018-10-21 RX ORDER — LEVOTHYROXINE SODIUM 25 UG/1
TABLET ORAL
Qty: 30 TABLET | Refills: 1 | Status: SHIPPED | OUTPATIENT
Start: 2018-10-21 | End: 2018-12-21 | Stop reason: SDUPTHER

## 2018-12-03 LAB
ALBUMIN SERPL-MCNC: 3.7 G/DL (ref 3.1–4.7)
ALP SERPL-CCNC: 133 IU/L (ref 40–104)
ALT (SGPT): 70 IU/L (ref 3–33)
AST SERPL-CCNC: 46 IU/L (ref 10–40)
BILIRUB SERPL-MCNC: 0.8 MG/DL (ref 0.3–1)
BUN SERPL-MCNC: 24 MG/DL (ref 8–20)
CALCIUM SERPL-MCNC: 9.7 MG/DL (ref 7.7–10.4)
CHLORIDE: 103 MMOL/L (ref 98–110)
CO2 SERPL-SCNC: 27.2 MMOL/L (ref 22.8–31.6)
CREATININE: 1.18 MG/DL (ref 0.6–1.4)
GLUCOSE: 111 MG/DL (ref 70–99)
POTASSIUM SERPL-SCNC: 4.5 MMOL/L (ref 3.5–5)
PROT SERPL-MCNC: 8.3 G/DL (ref 6–8.2)
SODIUM: 140 MMOL/L (ref 134–144)
T4 FREE SP9 P CHAL SERPL-SCNC: 0.63 NG/DL (ref 0.45–1.27)
TSH SERPL DL<=0.005 MIU/L-ACNC: 4.88 ULU/ML (ref 0.3–5.6)

## 2018-12-21 DIAGNOSIS — E03.9 HYPOTHYROIDISM, UNSPECIFIED TYPE: ICD-10-CM

## 2018-12-26 RX ORDER — LEVOTHYROXINE SODIUM 25 UG/1
TABLET ORAL
Qty: 30 TABLET | Refills: 1 | Status: SHIPPED | OUTPATIENT
Start: 2018-12-26 | End: 2019-01-21

## 2018-12-27 ENCOUNTER — TELEPHONE (OUTPATIENT)
Dept: FAMILY MEDICINE | Facility: CLINIC | Age: 68
End: 2018-12-27

## 2019-01-21 ENCOUNTER — OFFICE VISIT (OUTPATIENT)
Dept: FAMILY MEDICINE | Facility: CLINIC | Age: 69
End: 2019-01-21
Payer: MEDICARE

## 2019-01-21 VITALS
DIASTOLIC BLOOD PRESSURE: 80 MMHG | RESPIRATION RATE: 16 BRPM | BODY MASS INDEX: 35.34 KG/M2 | HEIGHT: 60 IN | OXYGEN SATURATION: 97 % | WEIGHT: 180 LBS | HEART RATE: 50 BPM | SYSTOLIC BLOOD PRESSURE: 136 MMHG

## 2019-01-21 DIAGNOSIS — E03.9 ACQUIRED HYPOTHYROIDISM: ICD-10-CM

## 2019-01-21 DIAGNOSIS — R00.1 BRADYCARDIA, SINUS: ICD-10-CM

## 2019-01-21 DIAGNOSIS — H25.9 SENILE CATARACT OF LEFT EYE, UNSPECIFIED AGE-RELATED CATARACT TYPE: ICD-10-CM

## 2019-01-21 DIAGNOSIS — E11.9 TYPE 2 DIABETES MELLITUS WITHOUT COMPLICATION, WITHOUT LONG-TERM CURRENT USE OF INSULIN: ICD-10-CM

## 2019-01-21 DIAGNOSIS — R77.8 ELEVATED TOTAL PROTEIN: ICD-10-CM

## 2019-01-21 DIAGNOSIS — Z01.818 PRE-OPERATIVE CLEARANCE: Primary | ICD-10-CM

## 2019-01-21 DIAGNOSIS — I10 ESSENTIAL HYPERTENSION: ICD-10-CM

## 2019-01-21 DIAGNOSIS — K75.81 NASH (NONALCOHOLIC STEATOHEPATITIS): ICD-10-CM

## 2019-01-21 DIAGNOSIS — E78.00 PURE HYPERCHOLESTEROLEMIA: ICD-10-CM

## 2019-01-21 PROCEDURE — G0008 ADMIN INFLUENZA VIRUS VAC: HCPCS | Mod: ,,, | Performed by: INTERNAL MEDICINE

## 2019-01-21 PROCEDURE — 99215 OFFICE O/P EST HI 40 MIN: CPT | Mod: 25,,, | Performed by: INTERNAL MEDICINE

## 2019-01-21 PROCEDURE — 99215 PR OFFICE/OUTPT VISIT, EST, LEVL V, 40-54 MIN: ICD-10-PCS | Mod: 25,,, | Performed by: INTERNAL MEDICINE

## 2019-01-21 PROCEDURE — G0008 FLU VACCINE - HIGH DOSE (65+) PRESERVATIVE FREE IM: ICD-10-PCS | Mod: ,,, | Performed by: INTERNAL MEDICINE

## 2019-01-21 PROCEDURE — 90662 IIV NO PRSV INCREASED AG IM: CPT | Mod: ,,, | Performed by: INTERNAL MEDICINE

## 2019-01-21 PROCEDURE — 90662 FLU VACCINE - HIGH DOSE (65+) PRESERVATIVE FREE IM: ICD-10-PCS | Mod: ,,, | Performed by: INTERNAL MEDICINE

## 2019-01-21 RX ORDER — LEVOTHYROXINE SODIUM 50 UG/1
50 TABLET ORAL DAILY
Qty: 90 TABLET | Refills: 3 | Status: SHIPPED | OUTPATIENT
Start: 2019-01-21 | End: 2020-02-07

## 2019-01-21 RX ORDER — LOSARTAN POTASSIUM 50 MG/1
50 TABLET ORAL DAILY
Qty: 90 TABLET | Refills: 3 | Status: SHIPPED | OUTPATIENT
Start: 2019-01-21 | End: 2020-02-24

## 2019-01-21 NOTE — PATIENT INSTRUCTIONS
A1C  Does this test have other names?  Hemoglobin A1c; HbA1c; glycosylated hemoglobin; glycohemoglobin; Glycated hemoglobin  What is this test?  A1C is a blood test used to screen people to find out whether they have diabetes or prediabetes. It's also used in people who know they have diabetes to measure how well they are controlling their blood sugar and to guide their treatment decisions over time.  Why do I need this test?  You may need this test to check for prediabetes or diabetes. If you already know that you have diabetes or prediabetes, you may need this test to see how well you are controlling your blood sugar.  People with diabetes must track their blood sugar (glucose) levels every day to make sure they arent too high or too low. The A1C test gives results for a longer period of time. It shows whether your blood sugar has been too high on average in the previous two to three months. When blood sugar is high, more glucose builds up and sticks to your hemoglobin, a protein that carries oxygen in red blood cells. The A1C test measures the percentage of hemoglobin that is coated with blood sugar.  Depending on the type of diabetes you have, how well it's controlled, and your health care providers preferences, you may need to have the A1C test two or more times a year. The American Diabetes Association (ADA) recommends that you have an A1C test at least twice a year if you are meeting your blood sugar goals and your blood sugar is well-controlled. If you arent meeting your goals or your medication has changed lately, you should have the A1C test more often. You also may have the test when your health care provider first starts working with you to treat your diabetes.  What other tests might I have along with this test?   If your health care provider is testing you for diabetes, you may also take a fasting plasma glucose test, or FPG, or an oral glucose tolerance test, or OGTT, as part of your screening  and diagnosis. You may also be tested for sugar, ketones or protein in the urine.  What do my test results mean?  Laboratory test results may vary depending on your age, gender, health history, the method used for the test, and many other factors. If your results are different from the results suggested below, this may not mean that you have a problem. Ask your health care provider to explain what the results mean for you.   A1C is reported as a percentage:  · Normal A1C is considered to be below 5.7 percent. Results between 5.7 and 6.4 percent may mean you have prediabetes. This means you have a higher risk of developing diabetes.  · Results of 6.5 percent or higher on two separate occasions may mean that you have diabetes.  · The ADA  recommends that people with diabetes should maintain an A1C below 7 percent. The American Association of Clinical Endocrinologists recommends an A1C of 6.5 percent or less. Recommendations may vary based on the person's age, medical conditions, or other factors.   How is this test done?  The test requires a blood sample, which is drawn through a needle from a vein in your arm.   Does this test pose any risks?  Taking a blood sample with a needle carries risks that include bleeding, infection, bruising, and a sense of lightheadedness. When the needle pricks your arm, you may feel a slight stinging sensation or pain. Afterward, the site may be slightly sore.  What might affect my test results?  Your blood sugar levels usually vary throughout the day. These variations won't affect the A1C.  If you have sickle cell anemia or other blood disorders, a standard A1C test may be less useful for diagnosing or monitoring diabetes. (Your health care provider may be able to find another diabetes test that will better serve you.) In addition, the test results may be skewed if you have anemia, heavy bleeding, an iron deficiency, kidney failure, or liver disease.  How do I get ready for this  test?  You don't need any special preparation for the test.    © 6777-8975 [employer]. [address] [place] [address], [place] [address]. All rights reserved. This information is not intended as a substitute for professional medical care. Always follow your healthcare professional's instructions.

## 2019-01-22 NOTE — PROGRESS NOTES
Subjective:       Patient ID: Elena Wells is a 68 y.o. female.    Chief Complaint: Pre-op Exam    68-year-old female is here for clearance for cataract surgery and she also has a history of Parra and is on Hepatza 4 g a day for over 9 months now. She had some blood work done before this office visit as well. She has lost 6 pounds in the past few months. She is doing healthy eating and developed outdoors remodeling. Her liver enzymes continued to decrease mostly seen in her transaminases. Now her AST is 75 ALT is 45. She also is on a path the for cholesterol. Lipid profile was fair and she will see her cardiologist next month as well. She cannot tolerate statins especially in view of her Parra. Her heart rate is on the low side and she is on Toprol 25 mg extended release. Is also on losartan 25. She also is a borderline diabetic and with his last labs we did not check an A1c with the intentions of Tequin here in the office with the machine is out of strips.      Review of Systems   Constitutional: Negative for activity change, appetite change, chills, diaphoresis, fatigue, fever and unexpected weight change.   HENT: Negative for congestion, ear pain, mouth sores, postnasal drip, sinus pressure, sore throat and trouble swallowing.    Eyes: Negative for pain, redness and visual disturbance.   Respiratory: Negative for apnea, cough, chest tightness, shortness of breath and wheezing.    Cardiovascular: Negative for chest pain, palpitations and leg swelling.   Gastrointestinal: Negative for abdominal distention, abdominal pain, blood in stool, constipation, diarrhea, nausea and vomiting.   Endocrine: Negative for cold intolerance, polydipsia, polyphagia and polyuria.   Genitourinary: Negative for difficulty urinating, dysuria, flank pain, frequency, hematuria, menstrual problem, pelvic pain and urgency.   Musculoskeletal: Negative for arthralgias, back pain, joint swelling and neck pain.   Skin: Negative for color  change, rash and wound.   Neurological: Negative for dizziness, tremors, seizures, syncope, weakness, light-headedness, numbness and headaches.   Hematological: Negative for adenopathy. Does not bruise/bleed easily.   Psychiatric/Behavioral: Negative for confusion, decreased concentration, dysphoric mood, hallucinations, self-injury, sleep disturbance and suicidal ideas. The patient is not nervous/anxious.        Past Medical History:   Diagnosis Date    Allergy     Diabetes mellitus, type 2        Past Surgical History:   Procedure Laterality Date    APPENDECTOMY      CARDIAC SURGERY      HYSTERECTOMY         Family History   Problem Relation Age of Onset    Diabetes Mother     Arthritis Mother     Hypertension Mother     Cancer Father     COPD Father        Social History     Socioeconomic History    Marital status:      Spouse name: None    Number of children: None    Years of education: None    Highest education level: None   Social Needs    Financial resource strain: None    Food insecurity - worry: None    Food insecurity - inability: None    Transportation needs - medical: None    Transportation needs - non-medical: None   Occupational History    None   Tobacco Use    Smoking status: Never Smoker    Smokeless tobacco: Never Used   Substance and Sexual Activity    Alcohol use: No    Drug use: No    Sexual activity: No   Other Topics Concern    None   Social History Narrative    None       Current Outpatient Medications   Medication Sig Dispense Refill    aspirin (ECOTRIN) 325 MG EC tablet Take 1 tablet by mouth once daily.      cetirizine (ZYRTEC) 10 MG tablet Take 1 tablet by mouth once daily.      evolocumab (REPATHA SURECLICK) 140 mg/mL PnIj Inject 1 Dose into the skin every 14 (fourteen) days.      fluticasone (FLONASE) 50 mcg/actuation nasal spray 1 spray by Each Nare route once daily.      levothyroxine (SYNTHROID) 50 MCG tablet Take 1 tablet (50 mcg total) by  mouth once daily. 90 tablet 3    losartan (COZAAR) 50 MG tablet Take 1 tablet (50 mg total) by mouth once daily. 90 tablet 3     No current facility-administered medications for this visit.        Review of patient's allergies indicates:  No Known Allergies  Objective:      Physical Exam   Constitutional: She is oriented to person, place, and time. She appears well-developed and well-nourished.   HENT:   Head: Normocephalic and atraumatic.   Eyes: Right eye exhibits no discharge. Left eye exhibits no discharge. No scleral icterus.   Neck: Neck supple. No JVD present.   Cardiovascular: Normal rate and regular rhythm. Exam reveals no gallop and no friction rub.   No murmur heard.  Pulmonary/Chest: Effort normal and breath sounds normal. She has no wheezes. She has no rales.   Abdominal: Soft. Bowel sounds are normal. She exhibits no distension. There is no tenderness.   Musculoskeletal: She exhibits no edema or tenderness.   Lymphadenopathy:     She has no cervical adenopathy.   Neurological: She is alert and oriented to person, place, and time. She has normal reflexes. No cranial nerve deficit.   Skin: No rash noted. No erythema.   Psychiatric: She has a normal mood and affect.   Nursing note and vitals reviewed.      Lab Results   Component Value Date    WBC 7.4 07/03/2018    HGB 13.9 07/03/2018    HCT 42.5 07/03/2018     07/03/2018    AST 46 (H) 12/03/2018     12/03/2018    K 4.5 12/03/2018     12/03/2018    CREATININE 1.18 12/03/2018    BUN 24 (H) 12/03/2018    CO2 27.2 12/03/2018    TSH 4.88 12/03/2018    HGBA1C 6.1 07/03/2018       Assessment:       1. Pre-operative clearance    2. Senile cataract of left eye, unspecified age-related cataract type    3. Type 2 diabetes mellitus without complication, without long-term current use of insulin    4. Pure hypercholesterolemia    5. Essential hypertension    6. Acquired hypothyroidism    7. Bradycardia, sinus    8. Elevated total protein    9. WATERS  (nonalcoholic steatohepatitis)        Plan:       Pre-operative clearance  for  Senile cataract of left eye, unspecified age-related cataract type  Patient is cleared for cataract surgery    Type 2 diabetes mellitus without complication, without long-term current use of insulin  -     Cancel: Hemoglobin A1c; Future; Expected date: 05/20/2019    Pure hypercholesterolemia- continue rapatha and follow with cards next week    Essential hypertension  -     losartan (COZAAR) 50 MG tablet; Take 1 tablet (50 mg total) by mouth once daily.  Dispense: 90 tablet; Refill: 3  -     Comprehensive metabolic panel; Future; Expected date: 05/20/2019    Acquired hypothyroidism  -     TSH; Future; Expected date: 05/20/2019  -     T4, free; Future; Expected date: 05/20/2019  -     levothyroxine (SYNTHROID) 50 MCG tablet; Take 1 tablet (50 mcg total) by mouth once daily.  Dispense: 90 tablet; Refill: 3    Bradycardia, sinus    Elevated total protein  -     Comprehensive metabolic panel; Future; Expected date: 05/20/2019  -     CBC auto differential; Future; Expected date: 05/20/2019    WATERS (nonalcoholic steatohepatitis)    Improving on hepaxa    Other orders  -     Influenza - High Dose (65+) (PF) (IM)

## 2019-01-27 ENCOUNTER — TELEPHONE (OUTPATIENT)
Dept: FAMILY MEDICINE | Facility: CLINIC | Age: 69
End: 2019-01-27

## 2019-01-27 NOTE — TELEPHONE ENCOUNTER
Sanjuana please print out my progress note from 121 and fax over with the clearance for cataract surgery to dr curran

## 2019-05-06 LAB
ALBUMIN SERPL-MCNC: 3.7 G/DL (ref 3.1–4.7)
ALP SERPL-CCNC: 120 IU/L (ref 40–104)
ALT (SGPT): 67 IU/L (ref 3–33)
AST SERPL-CCNC: 50 IU/L (ref 10–40)
BASOPHILS NFR BLD: 0.1 K/UL (ref 0–0.2)
BASOPHILS NFR BLD: 0.8 %
BILIRUB SERPL-MCNC: 0.8 MG/DL (ref 0.3–1)
BUN SERPL-MCNC: 21 MG/DL (ref 8–20)
CALCIUM SERPL-MCNC: 9.6 MG/DL (ref 7.7–10.4)
CHLORIDE: 102 MMOL/L (ref 98–110)
CO2 SERPL-SCNC: 25.7 MMOL/L (ref 22.8–31.6)
CREATININE: 1.2 MG/DL (ref 0.6–1.4)
EOSINOPHIL NFR BLD: 0.2 K/UL (ref 0–0.7)
EOSINOPHIL NFR BLD: 1.7 %
ERYTHROCYTE [DISTWIDTH] IN BLOOD BY AUTOMATED COUNT: 13.3 % (ref 11.7–14.9)
GLUCOSE: 113 MG/DL (ref 70–99)
GRAN #: 4.1 K/UL (ref 1.4–6.5)
GRAN%: 47.9 %
HBA1C MFR BLD: 5.9 % (ref 3.1–6.5)
HCT VFR BLD AUTO: 45.7 % (ref 36–48)
HGB BLD-MCNC: 14.5 G/DL (ref 12–15)
IMMATURE GRANS (ABS): 0 K/UL (ref 0–1)
IMMATURE GRANULOCYTES: 0.2 %
LYMPH #: 3.6 K/UL (ref 1.2–3.4)
LYMPH%: 42.3 %
MCH RBC QN AUTO: 29.9 PG (ref 25–35)
MCHC RBC AUTO-ENTMCNC: 31.7 G/DL (ref 31–36)
MCV RBC AUTO: 94.2 FL (ref 79–98)
MONO #: 0.6 K/UL (ref 0.1–0.6)
MONO%: 7.1 %
NUCLEATED RBCS: 0 %
PLATELET # BLD AUTO: 272 K/UL (ref 140–440)
PMV BLD AUTO: 12 FL (ref 8.8–12.7)
POTASSIUM SERPL-SCNC: 4.2 MMOL/L (ref 3.5–5)
PROT SERPL-MCNC: 7.8 G/DL (ref 6–8.2)
RBC # BLD AUTO: 4.85 M/UL (ref 3.5–5.5)
SODIUM: 137 MMOL/L (ref 134–144)
T4 FREE SP9 P CHAL SERPL-SCNC: 0.62 NG/DL (ref 0.45–1.27)
TSH SERPL DL<=0.005 MIU/L-ACNC: 3.26 ULU/ML (ref 0.3–5.6)
WBC # BLD AUTO: 8.6 K/UL (ref 5–10)

## 2019-05-07 ENCOUNTER — TELEPHONE (OUTPATIENT)
Dept: FAMILY MEDICINE | Facility: CLINIC | Age: 69
End: 2019-05-07

## 2019-05-07 NOTE — TELEPHONE ENCOUNTER
----- Message from Adry Mccarthy MD sent at 5/6/2019  7:06 PM CDT -----  LAB WORK LOOKS PRETTY STABLE - WILL DISCUSS IN FULL ON FOLLOW UP OV NEXT MONTH  LIVER STABLE AND GLUCOSE GOOD

## 2019-05-16 ENCOUNTER — TELEPHONE (OUTPATIENT)
Dept: FAMILY MEDICINE | Facility: CLINIC | Age: 69
End: 2019-05-16

## 2019-07-02 ENCOUNTER — OFFICE VISIT (OUTPATIENT)
Dept: FAMILY MEDICINE | Facility: CLINIC | Age: 69
End: 2019-07-02
Payer: MEDICARE

## 2019-07-02 VITALS
RESPIRATION RATE: 18 BRPM | SYSTOLIC BLOOD PRESSURE: 118 MMHG | WEIGHT: 189.81 LBS | BODY MASS INDEX: 37.27 KG/M2 | OXYGEN SATURATION: 96 % | DIASTOLIC BLOOD PRESSURE: 66 MMHG | HEART RATE: 70 BPM | HEIGHT: 60 IN | TEMPERATURE: 99 F

## 2019-07-02 DIAGNOSIS — E11.9 TYPE 2 DIABETES MELLITUS WITHOUT COMPLICATION, WITHOUT LONG-TERM CURRENT USE OF INSULIN: Primary | ICD-10-CM

## 2019-07-02 DIAGNOSIS — K75.81 NASH (NONALCOHOLIC STEATOHEPATITIS): ICD-10-CM

## 2019-07-02 DIAGNOSIS — I10 ESSENTIAL HYPERTENSION: ICD-10-CM

## 2019-07-02 DIAGNOSIS — E03.9 ACQUIRED HYPOTHYROIDISM: ICD-10-CM

## 2019-07-02 PROCEDURE — 99213 OFFICE O/P EST LOW 20 MIN: CPT | Mod: ,,, | Performed by: INTERNAL MEDICINE

## 2019-07-02 PROCEDURE — 99213 PR OFFICE/OUTPT VISIT, EST, LEVL III, 20-29 MIN: ICD-10-PCS | Mod: ,,, | Performed by: INTERNAL MEDICINE

## 2019-07-03 NOTE — PROGRESS NOTES
Subjective:       Patient ID: Elena Wells is a 69 y.o. female.    Chief Complaint: Hypertension (5 mo f/u ) and Diabetes (lab review)    69-year-old female who is here for a six-month follow-up for dyslipidemia for which she is followed with cardiology for the most part and on Repatha she is statin intolerant, mild hypothyroidism and hypertension. The patient had some blood work done in May and everything looks good. Her hemoglobin A1c which she is prediabetic and diet control was 5.9. She also has Parra and her liver enzymes are fairly stable mostly an elevation in transaminases thyroid functions were normal and the patient is up-to-date on her health maintenance. She is undergoing something at home with her daughter and grandkids but is not requesting anything for sleep or for stress this time.    Review of Systems   Constitutional: Negative for activity change, appetite change, chills, diaphoresis, fatigue, fever and unexpected weight change.   HENT: Negative for congestion, ear pain, mouth sores, postnasal drip, sinus pressure, sore throat and trouble swallowing.    Eyes: Negative for pain, redness and visual disturbance.   Respiratory: Negative for apnea, cough, chest tightness, shortness of breath and wheezing.    Cardiovascular: Negative for chest pain, palpitations and leg swelling.   Gastrointestinal: Negative for abdominal distention, abdominal pain, blood in stool, constipation, diarrhea, nausea and vomiting.   Endocrine: Negative for cold intolerance, polydipsia, polyphagia and polyuria.   Genitourinary: Negative for difficulty urinating, dysuria, flank pain, frequency, hematuria, menstrual problem, pelvic pain and urgency.   Musculoskeletal: Negative for arthralgias, back pain, joint swelling and neck pain.   Skin: Negative for color change, rash and wound.   Neurological: Negative for dizziness, tremors, seizures, syncope, weakness, light-headedness, numbness and headaches.   Hematological:  Negative for adenopathy. Does not bruise/bleed easily.   Psychiatric/Behavioral: Negative for confusion, decreased concentration, dysphoric mood, hallucinations, self-injury, sleep disturbance and suicidal ideas. The patient is not nervous/anxious.        Past Medical History:   Diagnosis Date    Allergy     Diabetes mellitus, type 2        Past Surgical History:   Procedure Laterality Date    APPENDECTOMY      CARDIAC SURGERY      HYSTERECTOMY         Family History   Problem Relation Age of Onset    Diabetes Mother     Arthritis Mother     Hypertension Mother     Cancer Father     COPD Father        Social History     Socioeconomic History    Marital status:      Spouse name: Not on file    Number of children: Not on file    Years of education: Not on file    Highest education level: Not on file   Occupational History    Not on file   Social Needs    Financial resource strain: Not on file    Food insecurity:     Worry: Not on file     Inability: Not on file    Transportation needs:     Medical: Not on file     Non-medical: Not on file   Tobacco Use    Smoking status: Never Smoker    Smokeless tobacco: Never Used   Substance and Sexual Activity    Alcohol use: No    Drug use: No    Sexual activity: Never   Lifestyle    Physical activity:     Days per week: Not on file     Minutes per session: Not on file    Stress: Very much   Relationships    Social connections:     Talks on phone: Not on file     Gets together: Not on file     Attends Mu-ism service: Not on file     Active member of club or organization: Not on file     Attends meetings of clubs or organizations: Not on file     Relationship status: Not on file   Other Topics Concern    Not on file   Social History Narrative    Not on file       Current Outpatient Medications   Medication Sig Dispense Refill    aspirin (ECOTRIN) 325 MG EC tablet Take 1 tablet by mouth once daily.      cetirizine (ZYRTEC) 10 MG tablet Take  1 tablet by mouth once daily.      evolocumab (REPATHA SURECLICK) 140 mg/mL PnIj Inject 1 Dose into the skin every 14 (fourteen) days.      levothyroxine (SYNTHROID) 50 MCG tablet Take 1 tablet (50 mcg total) by mouth once daily. 90 tablet 3    losartan (COZAAR) 50 MG tablet Take 1 tablet (50 mg total) by mouth once daily. 90 tablet 3     No current facility-administered medications for this visit.        Review of patient's allergies indicates:   Allergen Reactions    Nubain [nalbuphine] Other (See Comments)     Drops BP     Objective:      Physical Exam   Constitutional: She is oriented to person, place, and time. She appears well-developed and well-nourished.   HENT:   Head: Normocephalic and atraumatic.   Eyes: Right eye exhibits no discharge. Left eye exhibits no discharge. No scleral icterus.   Neck: Neck supple. No JVD present.   Cardiovascular: Normal rate and regular rhythm. Exam reveals no gallop and no friction rub.   No murmur heard.  Pulmonary/Chest: Effort normal and breath sounds normal. She has no wheezes. She has no rales.   Abdominal: Soft. Bowel sounds are normal. She exhibits no distension. There is no tenderness.   Musculoskeletal: She exhibits no edema or tenderness.   Lymphadenopathy:     She has no cervical adenopathy.   Neurological: She is alert and oriented to person, place, and time. She has normal reflexes. No cranial nerve deficit.   Skin: No rash noted. No erythema.   Psychiatric: She has a normal mood and affect.   Nursing note and vitals reviewed.      Lab Results   Component Value Date    WBC 8.6 05/06/2019    HGB 14.5 05/06/2019    HCT 45.7 05/06/2019     05/06/2019    AST 50 (H) 05/06/2019     05/06/2019    K 4.2 05/06/2019     05/06/2019    CREATININE 1.20 05/06/2019    BUN 21 (H) 05/06/2019    CO2 25.7 05/06/2019    TSH 3.26 05/06/2019    HGBA1C 5.9 05/06/2019       Assessment:       1. Type 2 diabetes mellitus without complication, without long-term  current use of insulin    2. Acquired hypothyroidism    3. WATERS (nonalcoholic steatohepatitis)    4. Essential hypertension        Plan:       Type 2 diabetes mellitus without complication, without long-term current use of insulin   Under good diet control    Acquired hypothyroidism   continue same dose of thyroid supplement    WATERS (nonalcoholic steatohepatitis)   Follows with gastro/liver specialist - stable - avoid alcohol as well    Essential hypertension   Controlled on above med regimen to include an ARB/ACE

## 2019-08-14 ENCOUNTER — TELEPHONE (OUTPATIENT)
Dept: ADMINISTRATIVE | Facility: HOSPITAL | Age: 69
End: 2019-08-14

## 2019-12-30 ENCOUNTER — HOSPITAL ENCOUNTER (OUTPATIENT)
Dept: PREADMISSION TESTING | Facility: HOSPITAL | Age: 69
Discharge: HOME | End: 2019-12-30
Attending: INTERNAL MEDICINE
Payer: MEDICARE

## 2019-12-30 ENCOUNTER — HOSPITAL ENCOUNTER (OUTPATIENT)
Dept: RADIOLOGY | Facility: HOSPITAL | Age: 69
Discharge: HOME | End: 2019-12-30
Attending: INTERNAL MEDICINE
Payer: MEDICARE

## 2019-12-30 VITALS — HEIGHT: 60 IN | WEIGHT: 194.88 LBS | BODY MASS INDEX: 38.26 KG/M2

## 2019-12-30 DIAGNOSIS — Z01.810 PRE-PROCEDURAL CARDIOVASCULAR EXAMINATION: Primary | ICD-10-CM

## 2019-12-30 DIAGNOSIS — R94.39 ABNORMAL STRESS TEST: ICD-10-CM

## 2019-12-30 LAB
ANION GAP SERPL CALC-SCNC: 11 MMOL/L (ref 8–16)
APTT PPP: 33 SEC (ref 23.6–33.3)
BACTERIA #/AREA URNS HPF: ABNORMAL /HPF
BASOPHILS # BLD AUTO: 0.1 K/UL (ref 0–0.2)
BASOPHILS NFR BLD: 1.2 % (ref 0–1.9)
BILIRUB UR QL STRIP: NEGATIVE
BUN SERPL-MCNC: 22 MG/DL (ref 8–23)
CALCIUM SERPL-MCNC: 9.6 MG/DL (ref 8.7–10.5)
CHLORIDE SERPL-SCNC: 103 MMOL/L (ref 95–110)
CLARITY UR: CLEAR
CO2 SERPL-SCNC: 25 MMOL/L (ref 23–29)
COLOR UR: YELLOW
CREAT SERPL-MCNC: 1.1 MG/DL (ref 0.5–1.4)
DIFFERENTIAL METHOD: NORMAL
EOSINOPHIL # BLD AUTO: 0.2 K/UL (ref 0–0.5)
EOSINOPHIL NFR BLD: 1.8 % (ref 0–8)
ERYTHROCYTE [DISTWIDTH] IN BLOOD BY AUTOMATED COUNT: 13.2 % (ref 11.5–14.5)
EST. GFR  (AFRICAN AMERICAN): 59.2 ML/MIN/1.73 M^2
EST. GFR  (NON AFRICAN AMERICAN): 51.3 ML/MIN/1.73 M^2
GLUCOSE SERPL-MCNC: 108 MG/DL (ref 70–110)
GLUCOSE UR QL STRIP: NEGATIVE
HCT VFR BLD AUTO: 44.5 % (ref 37–48.5)
HGB BLD-MCNC: 14.3 G/DL (ref 12–16)
HGB UR QL STRIP: NEGATIVE
IMM GRANULOCYTES # BLD AUTO: 0.02 K/UL (ref 0–0.04)
IMM GRANULOCYTES NFR BLD AUTO: 0.2 % (ref 0–0.5)
INR PPP: 1
KETONES UR QL STRIP: NEGATIVE
LEUKOCYTE ESTERASE UR QL STRIP: ABNORMAL
LYMPHOCYTES # BLD AUTO: 3.5 K/UL (ref 1–4.8)
LYMPHOCYTES NFR BLD: 39.9 % (ref 18–48)
MAGNESIUM SERPL-MCNC: 1.8 MG/DL (ref 1.6–2.6)
MCH RBC QN AUTO: 30.2 PG (ref 27–31)
MCHC RBC AUTO-ENTMCNC: 32.1 G/DL (ref 32–36)
MCV RBC AUTO: 94 FL (ref 82–98)
MICROSCOPIC COMMENT: ABNORMAL
MONOCYTES # BLD AUTO: 0.8 K/UL (ref 0.3–1)
MONOCYTES NFR BLD: 8.9 % (ref 4–15)
NEUTROPHILS # BLD AUTO: 4.2 K/UL (ref 1.8–7.7)
NEUTROPHILS NFR BLD: 48 % (ref 38–73)
NITRITE UR QL STRIP: NEGATIVE
NRBC BLD-RTO: 0 /100 WBC
PH UR STRIP: 6 [PH] (ref 5–8)
PLATELET # BLD AUTO: 254 K/UL (ref 150–350)
PMV BLD AUTO: 11.4 FL (ref 9.2–12.9)
POTASSIUM SERPL-SCNC: 3.9 MMOL/L (ref 3.5–5.1)
PROT UR QL STRIP: NEGATIVE
PROTHROMBIN TIME: 12.7 SEC (ref 10.6–14.8)
RBC # BLD AUTO: 4.74 M/UL (ref 4–5.4)
RBC #/AREA URNS HPF: 1 /HPF (ref 0–4)
SODIUM SERPL-SCNC: 139 MMOL/L (ref 136–145)
SP GR UR STRIP: 1.02 (ref 1–1.03)
URN SPEC COLLECT METH UR: ABNORMAL
UROBILINOGEN UR STRIP-ACNC: NEGATIVE EU/DL
WBC # BLD AUTO: 8.69 K/UL (ref 3.9–12.7)
WBC #/AREA URNS HPF: 2 /HPF (ref 0–5)

## 2019-12-30 PROCEDURE — 85025 COMPLETE CBC W/AUTO DIFF WBC: CPT

## 2019-12-30 PROCEDURE — 36415 COLL VENOUS BLD VENIPUNCTURE: CPT

## 2019-12-30 PROCEDURE — 71046 X-RAY EXAM CHEST 2 VIEWS: CPT | Mod: TC

## 2019-12-30 PROCEDURE — 85730 THROMBOPLASTIN TIME PARTIAL: CPT

## 2019-12-30 PROCEDURE — 80048 BASIC METABOLIC PNL TOTAL CA: CPT

## 2019-12-30 PROCEDURE — 81001 URINALYSIS AUTO W/SCOPE: CPT

## 2019-12-30 PROCEDURE — 83735 ASSAY OF MAGNESIUM: CPT

## 2019-12-30 PROCEDURE — 93005 ELECTROCARDIOGRAM TRACING: CPT

## 2019-12-30 PROCEDURE — 85610 PROTHROMBIN TIME: CPT

## 2019-12-30 NOTE — DISCHARGE INSTRUCTIONS
 Nothing to eat or drink after midnight the night before your procedure.   Do not take any medications the morning of your procedure.   Bring all your medications with you in the original pill bottles from pharmacy.   If you take blood thinners, ask your doctor if you should stop taking them.   Do not take metformin 24 hours prior to your procedure.   Do your Hibiclens wash the night before and morning of your procedure.   If you use a CPAP or BiPAP at home, please bring it with you the day of your procedure.   Make arrangements for someone you know to drive you home after your procedure. Taxi and Uber are not acceptable.     Thursday 01/02/2020 05:30AM

## 2020-01-02 ENCOUNTER — HOSPITAL ENCOUNTER (OUTPATIENT)
Facility: HOSPITAL | Age: 70
Discharge: HOME | End: 2020-01-02
Attending: INTERNAL MEDICINE | Admitting: INTERNAL MEDICINE
Payer: MEDICARE

## 2020-01-02 VITALS
WEIGHT: 194 LBS | BODY MASS INDEX: 37.89 KG/M2 | OXYGEN SATURATION: 96 % | DIASTOLIC BLOOD PRESSURE: 68 MMHG | SYSTOLIC BLOOD PRESSURE: 112 MMHG | HEART RATE: 60 BPM | RESPIRATION RATE: 28 BRPM | TEMPERATURE: 98 F

## 2020-01-02 DIAGNOSIS — R94.39 ABNORMAL STRESS TEST: Primary | ICD-10-CM

## 2020-01-02 LAB
ERYTHROCYTE [DISTWIDTH] IN BLOOD BY AUTOMATED COUNT: 13.2 % (ref 11.5–14.5)
HCT VFR BLD AUTO: 41.3 % (ref 37–48.5)
HGB BLD-MCNC: 13.4 G/DL (ref 12–16)
MCH RBC QN AUTO: 30.2 PG (ref 27–31)
MCHC RBC AUTO-ENTMCNC: 32.4 G/DL (ref 32–36)
MCV RBC AUTO: 93 FL (ref 82–98)
PLATELET # BLD AUTO: 222 K/UL (ref 150–350)
PMV BLD AUTO: 11.1 FL (ref 9.2–12.9)
RBC # BLD AUTO: 4.43 M/UL (ref 4–5.4)
WBC # BLD AUTO: 8.17 K/UL (ref 3.9–12.7)

## 2020-01-02 PROCEDURE — C1887 CATHETER, GUIDING: HCPCS | Performed by: INTERNAL MEDICINE

## 2020-01-02 PROCEDURE — 99152 MOD SED SAME PHYS/QHP 5/>YRS: CPT | Performed by: INTERNAL MEDICINE

## 2020-01-02 PROCEDURE — 25500020 PHARM REV CODE 255: Performed by: INTERNAL MEDICINE

## 2020-01-02 PROCEDURE — 25000003 PHARM REV CODE 250: Performed by: INTERNAL MEDICINE

## 2020-01-02 PROCEDURE — 99153 MOD SED SAME PHYS/QHP EA: CPT | Performed by: INTERNAL MEDICINE

## 2020-01-02 PROCEDURE — 63600175 PHARM REV CODE 636 W HCPCS: Performed by: INTERNAL MEDICINE

## 2020-01-02 PROCEDURE — 93455 CORONARY ART/GRFT ANGIO S&I: CPT

## 2020-01-02 PROCEDURE — C1725 CATH, TRANSLUMIN NON-LASER: HCPCS | Performed by: INTERNAL MEDICINE

## 2020-01-02 PROCEDURE — C1760 CLOSURE DEV, VASC: HCPCS | Performed by: INTERNAL MEDICINE

## 2020-01-02 PROCEDURE — C1894 INTRO/SHEATH, NON-LASER: HCPCS | Performed by: INTERNAL MEDICINE

## 2020-01-02 PROCEDURE — 85027 COMPLETE CBC AUTOMATED: CPT

## 2020-01-02 DEVICE — DEVICE CLOSURE  ANGIO-SEAL 6FR 610130: Type: IMPLANTABLE DEVICE | Site: GROIN | Status: FUNCTIONAL

## 2020-01-02 RX ORDER — SODIUM CHLORIDE 450 MG/100ML
INJECTION, SOLUTION INTRAVENOUS CONTINUOUS
Status: DISCONTINUED | OUTPATIENT
Start: 2020-01-02 | End: 2020-01-02 | Stop reason: HOSPADM

## 2020-01-02 RX ORDER — SODIUM CHLORIDE 450 MG/100ML
INJECTION, SOLUTION INTRAVENOUS
Status: DISCONTINUED | OUTPATIENT
Start: 2020-01-02 | End: 2020-01-02 | Stop reason: HOSPADM

## 2020-01-02 RX ORDER — MIDAZOLAM HYDROCHLORIDE 1 MG/ML
INJECTION INTRAMUSCULAR; INTRAVENOUS
Status: DISCONTINUED | OUTPATIENT
Start: 2020-01-02 | End: 2020-01-02 | Stop reason: HOSPADM

## 2020-01-02 RX ORDER — LANOLIN ALCOHOL/MO/W.PET/CERES
400 CREAM (GRAM) TOPICAL ONCE
Status: COMPLETED | OUTPATIENT
Start: 2020-01-02 | End: 2020-01-02

## 2020-01-02 RX ORDER — ACETAMINOPHEN 325 MG/1
650 TABLET ORAL EVERY 4 HOURS PRN
Status: DISCONTINUED | OUTPATIENT
Start: 2020-01-02 | End: 2020-01-02 | Stop reason: HOSPADM

## 2020-01-02 RX ORDER — POTASSIUM CHLORIDE 750 MG/1
10 CAPSULE, EXTENDED RELEASE ORAL ONCE
Status: COMPLETED | OUTPATIENT
Start: 2020-01-02 | End: 2020-01-02

## 2020-01-02 RX ORDER — ONDANSETRON 2 MG/ML
4 INJECTION INTRAMUSCULAR; INTRAVENOUS EVERY 12 HOURS PRN
Status: DISCONTINUED | OUTPATIENT
Start: 2020-01-02 | End: 2020-01-02 | Stop reason: HOSPADM

## 2020-01-02 RX ORDER — LIDOCAINE HYDROCHLORIDE 10 MG/ML
INJECTION, SOLUTION EPIDURAL; INFILTRATION; INTRACAUDAL; PERINEURAL
Status: DISCONTINUED | OUTPATIENT
Start: 2020-01-02 | End: 2020-01-02 | Stop reason: HOSPADM

## 2020-01-02 RX ORDER — FENTANYL CITRATE 50 UG/ML
INJECTION, SOLUTION INTRAMUSCULAR; INTRAVENOUS
Status: DISCONTINUED | OUTPATIENT
Start: 2020-01-02 | End: 2020-01-02 | Stop reason: HOSPADM

## 2020-01-02 RX ORDER — LORAZEPAM 1 MG/1
1 TABLET ORAL ONCE
Status: COMPLETED | OUTPATIENT
Start: 2020-01-02 | End: 2020-01-02

## 2020-01-02 RX ADMIN — POTASSIUM CHLORIDE 10 MEQ: 750 CAPSULE, EXTENDED RELEASE ORAL at 06:01

## 2020-01-02 RX ADMIN — SODIUM CHLORIDE: 0.45 INJECTION, SOLUTION INTRAVENOUS at 06:01

## 2020-01-02 RX ADMIN — MAGNESIUM OXIDE 400 MG: 400 TABLET ORAL at 06:01

## 2020-01-02 RX ADMIN — LORAZEPAM 1 MG: 1 TABLET ORAL at 06:01

## 2020-01-02 NOTE — PLAN OF CARE
1530 up ambulating in hallway to bathroom no co pain no acute distress noted right groin remains soft and non tender upon palpation

## 2020-01-02 NOTE — DISCHARGE INSTRUCTIONS
Post angiogram discharge care   You have a puncture site in you right groin   You can remove your current dressing in 24 hours and take a shower. Showers only for the next week. Do not sit in a bathtub or pool of water 7 days until the puncture site is healed.    Gently clean the puncture site daily using soap and water while showering, dry thoroughly and cover with a Band-Aid. Make sure the Band-Aid and puncture site stay clean and dry.   Inspect the site daily for tenderness, discharge or signs of infection.    Observe the puncture site for signs of bleeding, obvious oozing, formation on knot under skin, or bruising. If any of these were to occur you should immediately lie down flat and apply firm pressure at the insertion site for 10 minutes. If bleeding or swelling does not stop, call 911! Do NOT try to drive yourself to the hospital.    Drink at least 6-8 glasses of clear liquids durin the next 24 hours to flush out the dye.   You must not be alone for the next 24 hours.    You may experience soreness or tenderness that my last for 1 week.    You may have mild oozing from the puncture site and/or possible bruising that could last up to 2 weeks.   You may also have a small lump form that may last up to 6 weeks.        Activity   Do not drive or operate an automobile or hazardous machinery for 24 hours   Do note engage in sports for 1 week.   Limit lifting over 10 pounds for the nest week, or until puncture site heals.   Limit you activities for the next 48 hours. Avoid excessive bending or squatting.   You may resume normal activity in 2-3 days, let pain be your guide.    Call your Doctor immediatly if you experience any of the following:   Chest pain, palpitations, shortness of breath, excessive dizziness, fainting, nausea or vomiting.   Renato signs of infection: redness, warmth, swelling, pain, drainage (other than a little blood on bandaide), chills, poorly healing puncture site, fever  greater than 101.0 F   Change in color, coolness, swelling, numbness, or pain to the involved extremity   Significant bleeding from the puncture site.

## 2020-01-02 NOTE — Clinical Note
Catheter is repositioned to the ostial  SVG . Angiography performed of the left coronary arteries and right coronary arteries in multiple views. Angiography performed via hand injection with 20 mL of contrast.

## 2020-01-02 NOTE — Clinical Note
Catheter is inserted into the proximal   right coronary artery. Angiography performed of the right coronary arteries in multiple views. Angiography performed via hand injection with 12 mL of contrast.

## 2020-01-02 NOTE — PLAN OF CARE
2732 discharge instructions given to pt and friend verbalized understanding no co pain no acute distress noted right groin dsg cdi , discharged to home via wheelchair with friend

## 2020-01-02 NOTE — Clinical Note
Catheter is inserted into the ostial  left coronary artery. Angiography performed of the left coronary arteries in multiple views. Angiography performed via hand injection with 20 mL of contrast. [id number]

## 2020-01-02 NOTE — Clinical Note
Catheter is repositioned to the ostial  LIMA graft . Angiography performed  in multiple views. Angiography performed via hand injection with 6 mL of contrast.

## 2020-01-03 ENCOUNTER — LAB VISIT (OUTPATIENT)
Dept: LAB | Facility: HOSPITAL | Age: 70
End: 2020-01-03
Attending: INTERNAL MEDICINE
Payer: MEDICARE

## 2020-01-03 DIAGNOSIS — Z98.890 STATUS POST CORONARY ANGIOGRAM: Primary | ICD-10-CM

## 2020-01-03 DIAGNOSIS — R94.39 ABNORMAL BALLISTOCARDIOGRAM: ICD-10-CM

## 2020-01-03 LAB
BASOPHILS # BLD AUTO: 0.07 K/UL (ref 0–0.2)
BASOPHILS NFR BLD: 1 % (ref 0–1.9)
DIFFERENTIAL METHOD: NORMAL
EOSINOPHIL # BLD AUTO: 0.1 K/UL (ref 0–0.5)
EOSINOPHIL NFR BLD: 1.7 % (ref 0–8)
ERYTHROCYTE [DISTWIDTH] IN BLOOD BY AUTOMATED COUNT: 13.2 % (ref 11.5–14.5)
HCT VFR BLD AUTO: 41.7 % (ref 37–48.5)
HGB BLD-MCNC: 13.6 G/DL (ref 12–16)
IMM GRANULOCYTES # BLD AUTO: 0.01 K/UL (ref 0–0.04)
IMM GRANULOCYTES NFR BLD AUTO: 0.1 % (ref 0–0.5)
LYMPHOCYTES # BLD AUTO: 2.7 K/UL (ref 1–4.8)
LYMPHOCYTES NFR BLD: 39.1 % (ref 18–48)
MCH RBC QN AUTO: 30.6 PG (ref 27–31)
MCHC RBC AUTO-ENTMCNC: 32.6 G/DL (ref 32–36)
MCV RBC AUTO: 94 FL (ref 82–98)
MONOCYTES # BLD AUTO: 0.5 K/UL (ref 0.3–1)
MONOCYTES NFR BLD: 7.7 % (ref 4–15)
NEUTROPHILS # BLD AUTO: 3.5 K/UL (ref 1.8–7.7)
NEUTROPHILS NFR BLD: 50.4 % (ref 38–73)
NRBC BLD-RTO: 0 /100 WBC
PLATELET # BLD AUTO: 223 K/UL (ref 150–350)
PMV BLD AUTO: 11.8 FL (ref 9.2–12.9)
RBC # BLD AUTO: 4.45 M/UL (ref 4–5.4)
WBC # BLD AUTO: 7.01 K/UL (ref 3.9–12.7)

## 2020-01-03 PROCEDURE — 85025 COMPLETE CBC W/AUTO DIFF WBC: CPT

## 2020-01-03 PROCEDURE — 36415 COLL VENOUS BLD VENIPUNCTURE: CPT

## 2020-01-08 ENCOUNTER — OFFICE VISIT (OUTPATIENT)
Dept: FAMILY MEDICINE | Facility: CLINIC | Age: 70
End: 2020-01-08
Payer: MEDICARE

## 2020-01-08 VITALS
DIASTOLIC BLOOD PRESSURE: 70 MMHG | TEMPERATURE: 98 F | HEART RATE: 78 BPM | OXYGEN SATURATION: 97 % | SYSTOLIC BLOOD PRESSURE: 122 MMHG | HEIGHT: 60 IN | BODY MASS INDEX: 38.32 KG/M2 | WEIGHT: 195.19 LBS | RESPIRATION RATE: 18 BRPM

## 2020-01-08 DIAGNOSIS — E66.9 CLASS 2 OBESITY WITH BODY MASS INDEX (BMI) OF 38.0 TO 38.9 IN ADULT, UNSPECIFIED OBESITY TYPE, UNSPECIFIED WHETHER SERIOUS COMORBIDITY PRESENT: ICD-10-CM

## 2020-01-08 DIAGNOSIS — I10 ESSENTIAL HYPERTENSION: ICD-10-CM

## 2020-01-08 DIAGNOSIS — Z11.59 ENCOUNTER FOR HEPATITIS C SCREENING TEST FOR LOW RISK PATIENT: ICD-10-CM

## 2020-01-08 DIAGNOSIS — E03.8 OTHER SPECIFIED HYPOTHYROIDISM: ICD-10-CM

## 2020-01-08 DIAGNOSIS — Z13.1 DIABETES MELLITUS SCREENING: ICD-10-CM

## 2020-01-08 DIAGNOSIS — E78.00 PURE HYPERCHOLESTEROLEMIA: Primary | ICD-10-CM

## 2020-01-08 PROCEDURE — 1170F FXNL STATUS ASSESSED: CPT | Mod: ,,, | Performed by: FAMILY MEDICINE

## 2020-01-08 PROCEDURE — 1170F PR FUNCTIONAL STATUS ASSESSED: ICD-10-PCS | Mod: ,,, | Performed by: FAMILY MEDICINE

## 2020-01-08 PROCEDURE — 1159F MED LIST DOCD IN RCRD: CPT | Mod: ,,, | Performed by: FAMILY MEDICINE

## 2020-01-08 PROCEDURE — 99214 OFFICE O/P EST MOD 30 MIN: CPT | Mod: S$PBB,,, | Performed by: FAMILY MEDICINE

## 2020-01-08 PROCEDURE — 1126F PR PAIN SEVERITY QUANTIFIED, NO PAIN PRESENT: ICD-10-PCS | Mod: ,,, | Performed by: FAMILY MEDICINE

## 2020-01-08 PROCEDURE — 1159F PR MEDICATION LIST DOCUMENTED IN MEDICAL RECORD: ICD-10-PCS | Mod: ,,, | Performed by: FAMILY MEDICINE

## 2020-01-08 PROCEDURE — 99214 PR OFFICE/OUTPT VISIT, EST, LEVL IV, 30-39 MIN: ICD-10-PCS | Mod: S$PBB,,, | Performed by: FAMILY MEDICINE

## 2020-01-08 PROCEDURE — 99214 OFFICE O/P EST MOD 30 MIN: CPT | Performed by: FAMILY MEDICINE

## 2020-01-08 PROCEDURE — 1126F AMNT PAIN NOTED NONE PRSNT: CPT | Mod: ,,, | Performed by: FAMILY MEDICINE

## 2020-01-08 NOTE — PROGRESS NOTES
SUBJECTIVE:    Patient ID: Elena Wells is a 69 y.o. female.    Chief Complaint: Hyperlipidemia and Hypertension    70 yo female here today to establish care previously followed by Dr Mccarthy, she has several chronic medical conditions that appear well controlled. She has the diagnosis of DM but is not on medications and her most recent A1C was normal. Pt has no new complaints today and does not need any medications refilled. She will need up to date labs.    SPMHx:  HTN: Losartan 50mg her blood pressure is well controlled today  Hyperlipidemia: Repatha 140mg every 14 days  Hypothyroid: On Synthroid 50mcg has been on the same dose for 6 months  Fatty Liver Disease: Taking Repatha  CAD: 4 vessel bipass in 2012, has had recent angiogram 2020      Specialists  Cardiologist: Dr Coburn      Hyperlipidemia   This is a chronic problem. The current episode started more than 1 year ago. The problem is controlled. Recent lipid tests were reviewed and are normal. Exacerbating diseases include hypothyroidism and liver disease. She has no history of diabetes. There are no known factors aggravating her hyperlipidemia. Pertinent negatives include no chest pain or shortness of breath. Treatments tried: Monoclonal antibody. The current treatment provides moderate improvement of lipids. Compliance problems include adherence to exercise and adherence to diet.  Risk factors for coronary artery disease include dyslipidemia, hypertension, obesity, a sedentary lifestyle and post-menopausal.   Hypertension   This is a chronic problem. The current episode started more than 1 year ago. The problem is controlled. Pertinent negatives include no anxiety, blurred vision, chest pain, headaches, malaise/fatigue, neck pain, orthopnea, palpitations, peripheral edema, PND, shortness of breath or sweats. There are no associated agents to hypertension. Risk factors for coronary artery disease include dyslipidemia, obesity, post-menopausal  state and sedentary lifestyle. Past treatments include angiotensin blockers. The current treatment provides moderate improvement. Compliance problems include exercise and diet.  Identifiable causes of hypertension include a thyroid problem.   Thyroid Problem   Presents for follow-up visit. Patient reports no anxiety, cold intolerance, constipation, depressed mood, diaphoresis, diarrhea, dry skin, fatigue, hair loss, heat intolerance, hoarse voice, leg swelling, nail problem, palpitations, tremors, visual change, weight gain or weight loss. The symptoms have been stable. Her past medical history is significant for hyperlipidemia. There is no history of diabetes.         Past Medical History:   Diagnosis Date    Abnormal stress test     Allergy     CAD (coronary artery disease)     Cataract     Fatty liver     Gall stones     Hypercholesteremia     Hypertension     Hypothyroidism     Sleep apnea      Social History     Socioeconomic History    Marital status:      Spouse name: Not on file    Number of children: Not on file    Years of education: Not on file    Highest education level: Not on file   Occupational History    Not on file   Social Needs    Financial resource strain: Not on file    Food insecurity:     Worry: Not on file     Inability: Not on file    Transportation needs:     Medical: Not on file     Non-medical: Not on file   Tobacco Use    Smoking status: Never Smoker    Smokeless tobacco: Never Used   Substance and Sexual Activity    Alcohol use: No    Drug use: No    Sexual activity: Not on file   Lifestyle    Physical activity:     Days per week: Not on file     Minutes per session: Not on file    Stress: Very much   Relationships    Social connections:     Talks on phone: Not on file     Gets together: Not on file     Attends Evangelical service: Not on file     Active member of club or organization: Not on file     Attends meetings of clubs or organizations: Not on  file     Relationship status: Not on file   Other Topics Concern    Not on file   Social History Narrative    Not on file     Past Surgical History:   Procedure Laterality Date    APPENDECTOMY      CARDIAC CATHETERIZATION      CATARACT EXTRACTION, BILATERAL      CORONARY ANGIOGRAPHY Left 1/2/2020    Procedure: ANGIOGRAM, CORONARY ARTERY;  Surgeon: Kuldeep Mooer MD;  Location: [facility] CATH/EP LAB;  Service: Cardiology;  Laterality: Left;    CORONARY ARTERY BYPASS GRAFT  2012    [id number]    CORONARY BYPASS GRAFT ANGIOGRAPHY  1/2/2020    Procedure: Bypass graft study;  Surgeon: Kuldeep Moore MD;  Location: [facility] CATH/EP LAB;  Service: Cardiology;;    HYSTERECTOMY       Family History   Problem Relation Age of Onset    Diabetes Mother     Arthritis Mother     Hypertension Mother     Cancer Father     COPD Father      Current Outpatient Medications   Medication Sig Dispense Refill    aspirin (ECOTRIN) 325 MG EC tablet Take 1 tablet by mouth once daily.      cetirizine (ZYRTEC) 10 MG tablet Take 1 tablet by mouth once daily.      evolocumab (REPATHA SURECLICK) 140 mg/mL PnIj Inject 1 Dose into the skin every 14 (fourteen) days.      levothyroxine (SYNTHROID) 50 MCG tablet Take 1 tablet (50 mcg total) by mouth once daily. 90 tablet 3    losartan (COZAAR) 50 MG tablet Take 1 tablet (50 mg total) by mouth once daily. 90 tablet 3     No current facility-administered medications for this visit.      Review of patient's allergies indicates:   Allergen Reactions    Codeine Other (See Comments)     Hypotension      Nubain [nalbuphine] Other (See Comments)     Hypotension       Review of Systems   Constitutional: Negative for activity change, appetite change, diaphoresis, fatigue, fever, malaise/fatigue, unexpected weight change, weight gain and weight loss.   HENT: Negative for congestion, hoarse voice, postnasal drip, rhinorrhea, sinus pressure and sinus pain.    Eyes: Negative.  Negative for  blurred vision.   Respiratory: Negative for cough, chest tightness, shortness of breath and wheezing.    Cardiovascular: Negative for chest pain, palpitations, orthopnea and PND.   Gastrointestinal: Negative for abdominal distention, abdominal pain, blood in stool, constipation and diarrhea.   Endocrine: Negative for cold intolerance and heat intolerance.   Genitourinary: Negative for dysuria, flank pain, frequency and urgency.   Musculoskeletal: Negative for arthralgias, back pain and neck pain.   Neurological: Negative for tremors and headaches.   Psychiatric/Behavioral: Negative for behavioral problems. The patient is not nervous/anxious.           Blood pressure 122/70, pulse 78, temperature 98.3 °F (36.8 °C), temperature source Oral, resp. rate 18, height 5' (1.524 m), weight 88.5 kg (195 lb 3.2 oz), SpO2 97 %. Body mass index is 38.12 kg/m².   Objective:      Physical Exam   Constitutional: She is oriented to person, place, and time. She appears well-developed and well-nourished. No distress.   Obese     HENT:   Head: Normocephalic and atraumatic.   Nose: Nose normal.   Mouth/Throat: Oropharynx is clear and moist.   Eyes: Pupils are equal, round, and reactive to light. Conjunctivae and EOM are normal. No scleral icterus.   Cardiovascular: Normal rate, regular rhythm and normal heart sounds.   No murmur heard.  Pulmonary/Chest: Effort normal and breath sounds normal. No respiratory distress. She has no wheezes.   Abdominal: Soft. Bowel sounds are normal. She exhibits no distension.   Neurological: She is alert and oriented to person, place, and time.   Skin: Skin is warm and dry. No rash noted. She is not diaphoretic. No pallor.   Vitals reviewed.          Assessment:       1. Pure hypercholesterolemia    2. Other specified hypothyroidism    3. Class 2 obesity with body mass index (BMI) of 38.0 to 38.9 in adult, unspecified obesity type, unspecified whether serious comorbidity present    4. Diabetes mellitus  screening    5. Encounter for hepatitis C screening test for low risk patient         Plan:           Pure hypercholesterolemia  -     Lipid panel; Future; Expected date: 01/08/2020  Pt taking IM medication Repatha, for her hyperlipidemia and Fatty liver disease, I have expressed that she needs to increase her aerobic activity and cut back on her Carbohydrates and Limit red meat, butter, fried foods, cheese, and other foods that have a lot of saturated fat. Consume more: lean meats, fish, fruits, vegetables, whole grains, beans, lentils, and nuts.  Weight loss, and 30-45 min of cardiovascular exercise daily.  Other specified hypothyroidism  -     TSH; Future; Expected date: 01/08/2020  Pt had her medications changed at her last visit need up to date TSH.  Currently asymptomatic.    Class 2 obesity with body mass index (BMI) of 38.0 to 38.9 in adult, unspecified obesity type, unspecified whether serious comorbidity present  Encouraged diet and weight loss, to include 30 minutes of aerobic activity daily.    Diabetes mellitus screening  -     Comprehensive metabolic panel; Future; Expected date: 01/08/2020  routtine    Encounter for hepatitis C screening test for low risk patient  -     Hepatitis C antibody; Future; Expected date: 01/08/2020  Routine    Essential hypertension  Blood pressure is well controlled on one medication, will continue on her medication.

## 2020-02-06 DIAGNOSIS — E03.9 ACQUIRED HYPOTHYROIDISM: ICD-10-CM

## 2020-02-07 RX ORDER — LEVOTHYROXINE SODIUM 50 UG/1
TABLET ORAL
Qty: 90 TABLET | Refills: 3 | Status: SHIPPED | OUTPATIENT
Start: 2020-02-07 | End: 2021-01-11 | Stop reason: SDUPTHER

## 2020-02-07 RX ORDER — LEVOTHYROXINE SODIUM 50 UG/1
TABLET ORAL
Qty: 90 TABLET | Refills: 0 | Status: SHIPPED | OUTPATIENT
Start: 2020-02-07 | End: 2020-07-08

## 2020-02-24 DIAGNOSIS — I10 ESSENTIAL HYPERTENSION: ICD-10-CM

## 2020-02-24 RX ORDER — LOSARTAN POTASSIUM 50 MG/1
TABLET ORAL
Qty: 90 TABLET | Refills: 3 | Status: SHIPPED | OUTPATIENT
Start: 2020-02-24 | End: 2021-01-11 | Stop reason: SDUPTHER

## 2020-06-24 ENCOUNTER — LAB VISIT (OUTPATIENT)
Dept: LAB | Facility: HOSPITAL | Age: 70
End: 2020-06-24
Attending: FAMILY MEDICINE
Payer: MEDICARE

## 2020-06-24 DIAGNOSIS — Z11.59 ENCOUNTER FOR HEPATITIS C SCREENING TEST FOR LOW RISK PATIENT: ICD-10-CM

## 2020-06-24 DIAGNOSIS — E03.8 OTHER SPECIFIED HYPOTHYROIDISM: ICD-10-CM

## 2020-06-24 DIAGNOSIS — E78.00 PURE HYPERCHOLESTEROLEMIA: ICD-10-CM

## 2020-06-24 DIAGNOSIS — Z13.1 DIABETES MELLITUS SCREENING: ICD-10-CM

## 2020-06-24 LAB
ALBUMIN SERPL BCP-MCNC: 3.8 G/DL (ref 3.5–5.2)
ALP SERPL-CCNC: 176 U/L (ref 55–135)
ALT SERPL W/O P-5'-P-CCNC: 109 U/L (ref 10–44)
ANION GAP SERPL CALC-SCNC: 8 MMOL/L (ref 8–16)
AST SERPL-CCNC: 95 U/L (ref 10–40)
BILIRUB SERPL-MCNC: 0.7 MG/DL (ref 0.1–1)
BUN SERPL-MCNC: 21 MG/DL (ref 8–23)
CALCIUM SERPL-MCNC: 9.4 MG/DL (ref 8.7–10.5)
CHLORIDE SERPL-SCNC: 105 MMOL/L (ref 95–110)
CHOLEST SERPL-MCNC: 221 MG/DL (ref 120–199)
CHOLEST/HDLC SERPL: 3.9 {RATIO} (ref 2–5)
CO2 SERPL-SCNC: 26 MMOL/L (ref 23–29)
CREAT SERPL-MCNC: 1.3 MG/DL (ref 0.5–1.4)
EST. GFR  (AFRICAN AMERICAN): 48 ML/MIN/1.73 M^2
EST. GFR  (NON AFRICAN AMERICAN): 41.7 ML/MIN/1.73 M^2
GLUCOSE SERPL-MCNC: 118 MG/DL (ref 70–110)
HDLC SERPL-MCNC: 57 MG/DL (ref 40–75)
HDLC SERPL: 25.8 % (ref 20–50)
LDLC SERPL CALC-MCNC: 139.8 MG/DL (ref 63–159)
NONHDLC SERPL-MCNC: 164 MG/DL
POTASSIUM SERPL-SCNC: 4.6 MMOL/L (ref 3.5–5.1)
PROT SERPL-MCNC: 7.8 G/DL (ref 6–8.4)
SODIUM SERPL-SCNC: 139 MMOL/L (ref 136–145)
TRIGL SERPL-MCNC: 121 MG/DL (ref 30–150)
TSH SERPL DL<=0.005 MIU/L-ACNC: 3.56 UIU/ML (ref 0.34–5.6)

## 2020-06-24 PROCEDURE — 36415 COLL VENOUS BLD VENIPUNCTURE: CPT

## 2020-06-24 PROCEDURE — 80061 LIPID PANEL: CPT

## 2020-06-24 PROCEDURE — 86803 HEPATITIS C AB TEST: CPT

## 2020-06-24 PROCEDURE — 80053 COMPREHEN METABOLIC PANEL: CPT

## 2020-06-24 PROCEDURE — 84443 ASSAY THYROID STIM HORMONE: CPT

## 2020-06-25 ENCOUNTER — TELEPHONE (OUTPATIENT)
Dept: FAMILY MEDICINE | Facility: CLINIC | Age: 70
End: 2020-06-25

## 2020-06-25 LAB — HCV AB S/CO SERPL IA: <0.1 S/CO RATIO (ref 0–0.9)

## 2020-06-25 NOTE — TELEPHONE ENCOUNTER
----- Message from KILLIAN Beckham sent at 6/24/2020  5:30 PM CDT -----  Renal function decreased; hydrate well and keep follow up with Dr. Scruggs.

## 2020-07-08 ENCOUNTER — OFFICE VISIT (OUTPATIENT)
Dept: FAMILY MEDICINE | Facility: CLINIC | Age: 70
End: 2020-07-08
Payer: MEDICARE

## 2020-07-08 VITALS
HEIGHT: 60 IN | RESPIRATION RATE: 18 BRPM | SYSTOLIC BLOOD PRESSURE: 128 MMHG | OXYGEN SATURATION: 97 % | HEART RATE: 62 BPM | WEIGHT: 197 LBS | DIASTOLIC BLOOD PRESSURE: 86 MMHG | TEMPERATURE: 98 F | BODY MASS INDEX: 38.68 KG/M2

## 2020-07-08 DIAGNOSIS — K75.81 NASH (NONALCOHOLIC STEATOHEPATITIS): ICD-10-CM

## 2020-07-08 DIAGNOSIS — E78.00 PURE HYPERCHOLESTEROLEMIA: ICD-10-CM

## 2020-07-08 DIAGNOSIS — J30.2 SEASONAL ALLERGIC RHINITIS, UNSPECIFIED TRIGGER: ICD-10-CM

## 2020-07-08 DIAGNOSIS — I10 ESSENTIAL HYPERTENSION: Primary | ICD-10-CM

## 2020-07-08 DIAGNOSIS — E03.9 ACQUIRED HYPOTHYROIDISM: ICD-10-CM

## 2020-07-08 PROCEDURE — 1101F PR PT FALLS ASSESS DOC 0-1 FALLS W/OUT INJ PAST YR: ICD-10-PCS | Mod: S$GLB,,, | Performed by: FAMILY MEDICINE

## 2020-07-08 PROCEDURE — 99214 OFFICE O/P EST MOD 30 MIN: CPT | Mod: S$GLB,,, | Performed by: FAMILY MEDICINE

## 2020-07-08 PROCEDURE — 3074F SYST BP LT 130 MM HG: CPT | Mod: S$GLB,,, | Performed by: FAMILY MEDICINE

## 2020-07-08 PROCEDURE — 3079F PR MOST RECENT DIASTOLIC BLOOD PRESSURE 80-89 MM HG: ICD-10-PCS | Mod: S$GLB,,, | Performed by: FAMILY MEDICINE

## 2020-07-08 PROCEDURE — 3079F DIAST BP 80-89 MM HG: CPT | Mod: S$GLB,,, | Performed by: FAMILY MEDICINE

## 2020-07-08 PROCEDURE — 3008F PR BODY MASS INDEX (BMI) DOCUMENTED: ICD-10-PCS | Mod: S$GLB,,, | Performed by: FAMILY MEDICINE

## 2020-07-08 PROCEDURE — 3008F BODY MASS INDEX DOCD: CPT | Mod: S$GLB,,, | Performed by: FAMILY MEDICINE

## 2020-07-08 PROCEDURE — 1125F AMNT PAIN NOTED PAIN PRSNT: CPT | Mod: S$GLB,,, | Performed by: FAMILY MEDICINE

## 2020-07-08 PROCEDURE — 1159F MED LIST DOCD IN RCRD: CPT | Mod: S$GLB,,, | Performed by: FAMILY MEDICINE

## 2020-07-08 PROCEDURE — 1159F PR MEDICATION LIST DOCUMENTED IN MEDICAL RECORD: ICD-10-PCS | Mod: S$GLB,,, | Performed by: FAMILY MEDICINE

## 2020-07-08 PROCEDURE — 1101F PT FALLS ASSESS-DOCD LE1/YR: CPT | Mod: S$GLB,,, | Performed by: FAMILY MEDICINE

## 2020-07-08 PROCEDURE — 3074F PR MOST RECENT SYSTOLIC BLOOD PRESSURE < 130 MM HG: ICD-10-PCS | Mod: S$GLB,,, | Performed by: FAMILY MEDICINE

## 2020-07-08 PROCEDURE — 1125F PR PAIN SEVERITY QUANTIFIED, PAIN PRESENT: ICD-10-PCS | Mod: S$GLB,,, | Performed by: FAMILY MEDICINE

## 2020-07-08 PROCEDURE — 99214 PR OFFICE/OUTPT VISIT, EST, LEVL IV, 30-39 MIN: ICD-10-PCS | Mod: S$GLB,,, | Performed by: FAMILY MEDICINE

## 2020-07-08 RX ORDER — AZELASTINE 1 MG/ML
1 SPRAY, METERED NASAL 2 TIMES DAILY
Qty: 30 ML | Refills: 0 | Status: SHIPPED | OUTPATIENT
Start: 2020-07-08 | End: 2021-10-28

## 2020-07-08 NOTE — PROGRESS NOTES
SUBJECTIVE:    Patient ID: Elena Wells is a 70 y.o. female.    Chief Complaint: Hyperlipidemia, Hypertension, and Thyroid Problem  68 yo female here today to follow up on her most recent labs for her chronic diseases. Pt has no new complaints today and does not need any medications refilled. She will need up to date labs.     SPMHx:  HTN: Losartan 50mg her blood pressure is well controlled today  Hyperlipidemia: Repatha 140mg every 14 days  Hypothyroid: On Synthroid 50mcg has been on the same dose for 6 months TSH is controlled  Fatty Liver Disease: Taking Repatha  CAD: 4 vessel bipass in , has had recent angiogram   Pre-diabetes: fast blood sugar was elevated but not in diabetes range.     Specialists  Cardiologist: Dr Coburn    Smoke: None  ETOH: None  Exercise: 5 days a week    Lipids:  Chol: 221  TRI  HDL:57  LDL:139    Glucose 70 - 110 mg/dL 118High       TSH 0.340 - 5.600 uIU/mL 3.560          Hypertension  This is a chronic problem. The current episode started more than 1 year ago. The problem is unchanged. The problem is controlled. Pertinent negatives include no anxiety, blurred vision, chest pain, headaches, malaise/fatigue, neck pain, orthopnea, palpitations, peripheral edema, PND, shortness of breath or sweats. Risk factors for coronary artery disease include dyslipidemia, post-menopausal state and sedentary lifestyle. Past treatments include angiotensin blockers. The current treatment provides moderate improvement. Identifiable causes of hypertension include a thyroid problem.   Hyperlipidemia  This is a chronic problem. The current episode started more than 1 year ago. The problem is controlled. Recent lipid tests were reviewed and are normal. Exacerbating diseases include hypothyroidism. Pertinent negatives include no chest pain, myalgias or shortness of breath. The current treatment provides moderate improvement of lipids. Compliance problems include adherence to exercise  and adherence to diet.    Thyroid Problem  Presents for follow-up visit. Patient reports no anxiety, cold intolerance, constipation, diarrhea, fatigue, heat intolerance, palpitations, weight gain or weight loss. The symptoms have been stable. Her past medical history is significant for hyperlipidemia.         Past Medical History:   Diagnosis Date    Abnormal stress test     Allergy     CAD (coronary artery disease)     Cataract     Fatty liver     Gall stones     Hypercholesteremia     Hypertension     Hypothyroidism     Sleep apnea      Social History     Socioeconomic History    Marital status:      Spouse name: Not on file    Number of children: Not on file    Years of education: Not on file    Highest education level: Not on file   Occupational History    Not on file   Social Needs    Financial resource strain: Not on file    Food insecurity     Worry: Not on file     Inability: Not on file    Transportation needs     Medical: Not on file     Non-medical: Not on file   Tobacco Use    Smoking status: Never Smoker    Smokeless tobacco: Never Used   Substance and Sexual Activity    Alcohol use: No    Drug use: No    Sexual activity: Not on file   Lifestyle    Physical activity     Days per week: Not on file     Minutes per session: Not on file    Stress: Very much   Relationships    Social connections     Talks on phone: Not on file     Gets together: Not on file     Attends Confucianism service: Not on file     Active member of club or organization: Not on file     Attends meetings of clubs or organizations: Not on file     Relationship status: Not on file   Other Topics Concern    Not on file   Social History Narrative    Not on file     Past Surgical History:   Procedure Laterality Date    APPENDECTOMY      CARDIAC CATHETERIZATION      CATARACT EXTRACTION, BILATERAL      CORONARY ANGIOGRAPHY Left 1/2/2020    Procedure: ANGIOGRAM, CORONARY ARTERY;  Surgeon: Kuldeep INTERIANO  MD Teresa;  Location: [facility] CATH/EP LAB;  Service: Cardiology;  Laterality: Left;    CORONARY ARTERY BYPASS GRAFT  2012    [id number]    CORONARY BYPASS GRAFT ANGIOGRAPHY  1/2/2020    Procedure: Bypass graft study;  Surgeon: Kuldeep Moroe MD;  Location: [facility] CATH/EP LAB;  Service: Cardiology;;    HYSTERECTOMY       Family History   Problem Relation Age of Onset    Diabetes Mother     Arthritis Mother     Hypertension Mother     Cancer Father     COPD Father      Current Outpatient Medications   Medication Sig Dispense Refill    aspirin (ECOTRIN) 325 MG EC tablet Take 1 tablet by mouth once daily.      cetirizine (ZYRTEC) 10 MG tablet Take 1 tablet by mouth once daily.      evolocumab (REPATHA SURECLICK) 140 mg/mL PnIj Inject 1 Dose into the skin every 14 (fourteen) days.      levothyroxine (SYNTHROID) 50 MCG tablet TAKE ONE TABLET BY MOUTH EVERY DAY 90 tablet 3    losartan (COZAAR) 50 MG tablet TAKE ONE TABLET BY MOUTH EVERY DAY 90 tablet 3    azelastine (ASTELIN) 137 mcg (0.1 %) nasal spray 1 spray (137 mcg total) by Nasal route 2 (two) times daily. 30 mL 0     No current facility-administered medications for this visit.      Review of patient's allergies indicates:   Allergen Reactions    Codeine Other (See Comments)     Hypotension      Nubain [nalbuphine] Other (See Comments)     Hypotension       Review of Systems   Constitutional: Negative for activity change, appetite change, fatigue, malaise/fatigue, unexpected weight change, weight gain and weight loss.   HENT: Positive for postnasal drip. Negative for congestion, ear pain, hearing loss, sinus pressure, sinus pain, sneezing and sore throat.    Eyes: Negative for blurred vision, photophobia and pain.   Respiratory: Negative for cough, chest tightness, shortness of breath and wheezing.    Cardiovascular: Negative for chest pain, palpitations, orthopnea, leg swelling and PND.   Gastrointestinal: Negative for abdominal distention,  abdominal pain, blood in stool, constipation, diarrhea, nausea and vomiting.   Endocrine: Negative for cold intolerance, heat intolerance, polydipsia and polyuria.   Genitourinary: Negative for difficulty urinating, dysuria, flank pain, frequency, hematuria, pelvic pain and urgency.   Musculoskeletal: Negative for arthralgias, back pain, joint swelling, myalgias and neck pain.   Skin: Negative for pallor.   Allergic/Immunologic: Negative for environmental allergies and food allergies.   Neurological: Negative for dizziness, weakness, light-headedness, numbness and headaches.   Hematological: Does not bruise/bleed easily.   Psychiatric/Behavioral: Negative for agitation, confusion, decreased concentration and sleep disturbance. The patient is not nervous/anxious.           Blood pressure 128/86, pulse 62, temperature 98.2 °F (36.8 °C), temperature source Temporal, resp. rate 18, height 5' (1.524 m), weight 89.4 kg (197 lb), SpO2 97 %. Body mass index is 38.47 kg/m².   Objective:      Physical Exam  Constitutional:       General: She is not in acute distress.     Appearance: Normal appearance. She is well-developed. She is obese.   HENT:      Head: Normocephalic and atraumatic.      Right Ear: Tympanic membrane, ear canal and external ear normal.      Left Ear: Tympanic membrane, ear canal and external ear normal.      Mouth/Throat:      Mouth: Mucous membranes are moist.      Pharynx: Oropharynx is clear. No oropharyngeal exudate.   Eyes:      General:         Right eye: No discharge.         Left eye: No discharge.      Conjunctiva/sclera: Conjunctivae normal.      Pupils: Pupils are equal, round, and reactive to light.   Cardiovascular:      Rate and Rhythm: Normal rate and regular rhythm.      Heart sounds: Normal heart sounds. No murmur.   Pulmonary:      Effort: Pulmonary effort is normal. No respiratory distress.      Breath sounds: No wheezing.   Skin:     General: Skin is warm and dry.   Neurological:       Mental Status: She is alert.             Assessment:       1. Essential hypertension    2. Pure hypercholesterolemia    3. Acquired hypothyroidism    4. WATERS (nonalcoholic steatohepatitis)    5. Seasonal allergic rhinitis, unspecified trigger         Plan:           Essential hypertension  Reduce the amount of salt in your diet; Lose weight; Avoid drinking too much alcohol; Exercise at least 30 minutes per day most days of the week.  Continue current medications and home BP monitoring.  Pure hypercholesterolemia  Limit red meat, butter, fried foods, cheese, and other foods that have a lot of saturated fat. Consume more: lean meats, fish, fruits, vegetables, whole grains, beans, lentils, and nuts.  Weight loss, and 30-45 min of cardiovascular exercise daily.  Acquired hypothyroidism  No evidence of symptoms consistant with hyper/hypo thyroid. pts TSH is WNL will continue her current meds.    WATERS (nonalcoholic steatohepatitis)  -     Comprehensive metabolic panel; Future; Expected date: 07/08/2020  Pt has  A hx of WATERS, discussed diet exercise, weight loss, and using metamucil BID will re-check her LFTs in 6 months.    Seasonal allergic rhinitis, unspecified trigger  -     azelastine (ASTELIN) 137 mcg (0.1 %) nasal spray; 1 spray (137 mcg total) by Nasal route 2 (two) times daily.  Dispense: 30 mL; Refill: 0

## 2020-10-05 RX ORDER — EVOLOCUMAB 140 MG/ML
140 INJECTION, SOLUTION SUBCUTANEOUS
Qty: 2 SYRINGE | Refills: 3 | Status: SHIPPED | OUTPATIENT
Start: 2020-10-05 | End: 2021-03-18 | Stop reason: SDUPTHER

## 2020-10-08 ENCOUNTER — TELEPHONE (OUTPATIENT)
Dept: FAMILY MEDICINE | Facility: CLINIC | Age: 70
End: 2020-10-08

## 2020-10-08 DIAGNOSIS — E11.9 TYPE 2 DIABETES MELLITUS WITHOUT COMPLICATION, WITHOUT LONG-TERM CURRENT USE OF INSULIN: Primary | ICD-10-CM

## 2020-10-08 NOTE — TELEPHONE ENCOUNTER
Please sign the order. Patient states that she will go tomorrow depending on the weather or the early next week. She verbalized understanding in regards to one of the tests is fasting.

## 2020-10-12 ENCOUNTER — LAB VISIT (OUTPATIENT)
Dept: LAB | Facility: HOSPITAL | Age: 70
End: 2020-10-12
Attending: FAMILY MEDICINE
Payer: MEDICARE

## 2020-10-12 DIAGNOSIS — E11.9 TYPE 2 DIABETES MELLITUS WITHOUT COMPLICATION, WITHOUT LONG-TERM CURRENT USE OF INSULIN: ICD-10-CM

## 2020-10-12 DIAGNOSIS — K75.81 NASH (NONALCOHOLIC STEATOHEPATITIS): ICD-10-CM

## 2020-10-12 LAB
ALBUMIN SERPL BCP-MCNC: 3.6 G/DL (ref 3.5–5.2)
ALP SERPL-CCNC: 159 U/L (ref 55–135)
ALT SERPL W/O P-5'-P-CCNC: 96 U/L (ref 10–44)
ANION GAP SERPL CALC-SCNC: 10 MMOL/L (ref 8–16)
AST SERPL-CCNC: 63 U/L (ref 10–40)
BILIRUB SERPL-MCNC: 0.6 MG/DL (ref 0.1–1)
BUN SERPL-MCNC: 17 MG/DL (ref 8–23)
CALCIUM SERPL-MCNC: 9.6 MG/DL (ref 8.7–10.5)
CHLORIDE SERPL-SCNC: 104 MMOL/L (ref 95–110)
CO2 SERPL-SCNC: 27 MMOL/L (ref 23–29)
CREAT SERPL-MCNC: 1.1 MG/DL (ref 0.5–1.4)
EST. GFR  (AFRICAN AMERICAN): 58.8 ML/MIN/1.73 M^2
EST. GFR  (NON AFRICAN AMERICAN): 51 ML/MIN/1.73 M^2
ESTIMATED AVG GLUCOSE: 126 MG/DL (ref 68–131)
GLUCOSE SERPL-MCNC: 110 MG/DL (ref 70–110)
HBA1C MFR BLD HPLC: 6 % (ref 4.5–6.2)
POTASSIUM SERPL-SCNC: 4.3 MMOL/L (ref 3.5–5.1)
PROT SERPL-MCNC: 7.6 G/DL (ref 6–8.4)
SODIUM SERPL-SCNC: 141 MMOL/L (ref 136–145)

## 2020-10-12 PROCEDURE — 80053 COMPREHEN METABOLIC PANEL: CPT

## 2020-10-12 PROCEDURE — 83036 HEMOGLOBIN GLYCOSYLATED A1C: CPT

## 2020-10-12 PROCEDURE — 36415 COLL VENOUS BLD VENIPUNCTURE: CPT

## 2020-10-13 ENCOUNTER — TELEPHONE (OUTPATIENT)
Dept: FAMILY MEDICINE | Facility: CLINIC | Age: 70
End: 2020-10-13

## 2020-10-13 DIAGNOSIS — R79.89 ABNORMAL LIVER FUNCTION TESTS: Primary | ICD-10-CM

## 2020-10-13 NOTE — TELEPHONE ENCOUNTER
----- Message from Jus Scruggs MD sent at 10/13/2020  5:24 AM CDT -----  Please call the patient with results if they do not have portal access. Please call Mrs Mello please call her and let her know I have reviewed her labs and would like to get an US of her liver.

## 2020-10-13 NOTE — PROGRESS NOTES
Please call the patient with results if they do not have portal access. Please call Mrs Mello please call her and let her know I have reviewed her labs and would like to get an US of her liver.

## 2020-10-20 ENCOUNTER — HOSPITAL ENCOUNTER (OUTPATIENT)
Dept: RADIOLOGY | Facility: HOSPITAL | Age: 70
Discharge: HOME | End: 2020-10-20
Attending: FAMILY MEDICINE
Payer: MEDICARE

## 2020-10-20 DIAGNOSIS — R79.89 ABNORMAL LIVER FUNCTION TESTS: ICD-10-CM

## 2020-10-20 PROCEDURE — 76705 ECHO EXAM OF ABDOMEN: CPT | Mod: TC,PO

## 2020-12-14 ENCOUNTER — HOSPITAL ENCOUNTER (OUTPATIENT)
Dept: RADIOLOGY | Facility: HOSPITAL | Age: 70
Discharge: HOME | End: 2020-12-14
Attending: OTOLARYNGOLOGY
Payer: MEDICARE

## 2020-12-14 DIAGNOSIS — J32.4 CHRONIC PANSINUSITIS: ICD-10-CM

## 2020-12-14 DIAGNOSIS — J32.4 CHRONIC PANSINUSITIS: Primary | ICD-10-CM

## 2020-12-14 PROCEDURE — 70220 X-RAY EXAM OF SINUSES: CPT | Mod: TC,PO

## 2021-01-11 ENCOUNTER — OFFICE VISIT (OUTPATIENT)
Dept: FAMILY MEDICINE | Facility: CLINIC | Age: 71
End: 2021-01-11
Payer: MEDICARE

## 2021-01-11 VITALS
WEIGHT: 198.69 LBS | HEIGHT: 60 IN | BODY MASS INDEX: 39.01 KG/M2 | OXYGEN SATURATION: 95 % | SYSTOLIC BLOOD PRESSURE: 138 MMHG | TEMPERATURE: 98 F | DIASTOLIC BLOOD PRESSURE: 88 MMHG | RESPIRATION RATE: 17 BRPM | HEART RATE: 79 BPM

## 2021-01-11 DIAGNOSIS — Z78.0 MENOPAUSE: ICD-10-CM

## 2021-01-11 DIAGNOSIS — E03.9 ACQUIRED HYPOTHYROIDISM: ICD-10-CM

## 2021-01-11 DIAGNOSIS — Z23 NEED FOR IMMUNIZATION AGAINST INFLUENZA: ICD-10-CM

## 2021-01-11 DIAGNOSIS — E78.00 PURE HYPERCHOLESTEROLEMIA: ICD-10-CM

## 2021-01-11 DIAGNOSIS — I10 ESSENTIAL HYPERTENSION: ICD-10-CM

## 2021-01-11 DIAGNOSIS — K75.81 NASH (NONALCOHOLIC STEATOHEPATITIS): Primary | ICD-10-CM

## 2021-01-11 DIAGNOSIS — Z12.31 ENCOUNTER FOR SCREENING MAMMOGRAM FOR MALIGNANT NEOPLASM OF BREAST: ICD-10-CM

## 2021-01-11 PROCEDURE — 3079F PR MOST RECENT DIASTOLIC BLOOD PRESSURE 80-89 MM HG: ICD-10-PCS | Mod: S$GLB,,, | Performed by: FAMILY MEDICINE

## 2021-01-11 PROCEDURE — 1159F MED LIST DOCD IN RCRD: CPT | Mod: S$GLB,,, | Performed by: FAMILY MEDICINE

## 2021-01-11 PROCEDURE — 99214 PR OFFICE/OUTPT VISIT, EST, LEVL IV, 30-39 MIN: ICD-10-PCS | Mod: 25,S$GLB,, | Performed by: FAMILY MEDICINE

## 2021-01-11 PROCEDURE — 3075F PR MOST RECENT SYSTOLIC BLOOD PRESS GE 130-139MM HG: ICD-10-PCS | Mod: S$GLB,,, | Performed by: FAMILY MEDICINE

## 2021-01-11 PROCEDURE — 3079F DIAST BP 80-89 MM HG: CPT | Mod: S$GLB,,, | Performed by: FAMILY MEDICINE

## 2021-01-11 PROCEDURE — 1159F PR MEDICATION LIST DOCUMENTED IN MEDICAL RECORD: ICD-10-PCS | Mod: S$GLB,,, | Performed by: FAMILY MEDICINE

## 2021-01-11 PROCEDURE — 1101F PT FALLS ASSESS-DOCD LE1/YR: CPT | Mod: S$GLB,,, | Performed by: FAMILY MEDICINE

## 2021-01-11 PROCEDURE — 3008F PR BODY MASS INDEX (BMI) DOCUMENTED: ICD-10-PCS | Mod: S$GLB,,, | Performed by: FAMILY MEDICINE

## 2021-01-11 PROCEDURE — 3008F BODY MASS INDEX DOCD: CPT | Mod: S$GLB,,, | Performed by: FAMILY MEDICINE

## 2021-01-11 PROCEDURE — 3075F SYST BP GE 130 - 139MM HG: CPT | Mod: S$GLB,,, | Performed by: FAMILY MEDICINE

## 2021-01-11 PROCEDURE — 1170F PR FUNCTIONAL STATUS ASSESSED: ICD-10-PCS | Mod: S$GLB,,, | Performed by: FAMILY MEDICINE

## 2021-01-11 PROCEDURE — 1170F FXNL STATUS ASSESSED: CPT | Mod: S$GLB,,, | Performed by: FAMILY MEDICINE

## 2021-01-11 PROCEDURE — 3288F FALL RISK ASSESSMENT DOCD: CPT | Mod: S$GLB,,, | Performed by: FAMILY MEDICINE

## 2021-01-11 PROCEDURE — 90662 IIV NO PRSV INCREASED AG IM: CPT | Mod: S$GLB,,, | Performed by: FAMILY MEDICINE

## 2021-01-11 PROCEDURE — G0008 FLU VACCINE - QUADRIVALENT - HIGH DOSE (65+) PRESERVATIVE FREE IM: ICD-10-PCS | Mod: S$GLB,,, | Performed by: FAMILY MEDICINE

## 2021-01-11 PROCEDURE — 1101F PR PT FALLS ASSESS DOC 0-1 FALLS W/OUT INJ PAST YR: ICD-10-PCS | Mod: S$GLB,,, | Performed by: FAMILY MEDICINE

## 2021-01-11 PROCEDURE — G0008 ADMIN INFLUENZA VIRUS VAC: HCPCS | Mod: S$GLB,,, | Performed by: FAMILY MEDICINE

## 2021-01-11 PROCEDURE — 99214 OFFICE O/P EST MOD 30 MIN: CPT | Mod: 25,S$GLB,, | Performed by: FAMILY MEDICINE

## 2021-01-11 PROCEDURE — 3288F PR FALLS RISK ASSESSMENT DOCUMENTED: ICD-10-PCS | Mod: S$GLB,,, | Performed by: FAMILY MEDICINE

## 2021-01-11 PROCEDURE — 90662 FLU VACCINE - QUADRIVALENT - HIGH DOSE (65+) PRESERVATIVE FREE IM: ICD-10-PCS | Mod: S$GLB,,, | Performed by: FAMILY MEDICINE

## 2021-01-11 RX ORDER — LOSARTAN POTASSIUM 50 MG/1
50 TABLET ORAL DAILY
Qty: 90 TABLET | Refills: 3 | Status: SHIPPED | OUTPATIENT
Start: 2021-01-11 | End: 2022-01-19

## 2021-01-11 RX ORDER — LEVOTHYROXINE SODIUM 50 UG/1
50 TABLET ORAL DAILY
Qty: 90 TABLET | Refills: 3 | Status: SHIPPED | OUTPATIENT
Start: 2021-01-11 | End: 2022-04-07

## 2021-01-13 DIAGNOSIS — Z12.31 ENCOUNTER FOR SCREENING MAMMOGRAM FOR MALIGNANT NEOPLASM OF BREAST: Primary | ICD-10-CM

## 2021-01-22 ENCOUNTER — PATIENT MESSAGE (OUTPATIENT)
Dept: ADMINISTRATIVE | Facility: OTHER | Age: 71
End: 2021-01-22

## 2021-02-02 ENCOUNTER — OFFICE VISIT (OUTPATIENT)
Dept: CARDIOLOGY | Facility: CLINIC | Age: 71
End: 2021-02-02
Payer: MEDICARE

## 2021-02-02 VITALS
OXYGEN SATURATION: 95 % | HEART RATE: 64 BPM | SYSTOLIC BLOOD PRESSURE: 122 MMHG | WEIGHT: 201 LBS | DIASTOLIC BLOOD PRESSURE: 70 MMHG | BODY MASS INDEX: 39.46 KG/M2 | HEIGHT: 60 IN

## 2021-02-02 DIAGNOSIS — I10 ESSENTIAL HYPERTENSION: ICD-10-CM

## 2021-02-02 DIAGNOSIS — E78.2 MIXED HYPERLIPIDEMIA: ICD-10-CM

## 2021-02-02 DIAGNOSIS — I25.10 ATHEROSCLEROSIS OF NATIVE CORONARY ARTERY OF NATIVE HEART WITHOUT ANGINA PECTORIS: ICD-10-CM

## 2021-02-02 PROCEDURE — 99214 OFFICE O/P EST MOD 30 MIN: CPT | Mod: S$GLB,,, | Performed by: INTERNAL MEDICINE

## 2021-02-02 PROCEDURE — 3078F DIAST BP <80 MM HG: CPT | Mod: S$GLB,,, | Performed by: INTERNAL MEDICINE

## 2021-02-02 PROCEDURE — 3288F PR FALLS RISK ASSESSMENT DOCUMENTED: ICD-10-PCS | Mod: S$GLB,,, | Performed by: INTERNAL MEDICINE

## 2021-02-02 PROCEDURE — 1101F PR PT FALLS ASSESS DOC 0-1 FALLS W/OUT INJ PAST YR: ICD-10-PCS | Mod: S$GLB,,, | Performed by: INTERNAL MEDICINE

## 2021-02-02 PROCEDURE — 3288F FALL RISK ASSESSMENT DOCD: CPT | Mod: S$GLB,,, | Performed by: INTERNAL MEDICINE

## 2021-02-02 PROCEDURE — 3074F PR MOST RECENT SYSTOLIC BLOOD PRESSURE < 130 MM HG: ICD-10-PCS | Mod: S$GLB,,, | Performed by: INTERNAL MEDICINE

## 2021-02-02 PROCEDURE — 99214 PR OFFICE/OUTPT VISIT, EST, LEVL IV, 30-39 MIN: ICD-10-PCS | Mod: S$GLB,,, | Performed by: INTERNAL MEDICINE

## 2021-02-02 PROCEDURE — 3008F PR BODY MASS INDEX (BMI) DOCUMENTED: ICD-10-PCS | Mod: S$GLB,,, | Performed by: INTERNAL MEDICINE

## 2021-02-02 PROCEDURE — 1101F PT FALLS ASSESS-DOCD LE1/YR: CPT | Mod: S$GLB,,, | Performed by: INTERNAL MEDICINE

## 2021-02-02 PROCEDURE — 3074F SYST BP LT 130 MM HG: CPT | Mod: S$GLB,,, | Performed by: INTERNAL MEDICINE

## 2021-02-02 PROCEDURE — 3078F PR MOST RECENT DIASTOLIC BLOOD PRESSURE < 80 MM HG: ICD-10-PCS | Mod: S$GLB,,, | Performed by: INTERNAL MEDICINE

## 2021-02-02 PROCEDURE — 3008F BODY MASS INDEX DOCD: CPT | Mod: S$GLB,,, | Performed by: INTERNAL MEDICINE

## 2021-02-02 PROCEDURE — 1159F PR MEDICATION LIST DOCUMENTED IN MEDICAL RECORD: ICD-10-PCS | Mod: S$GLB,,, | Performed by: INTERNAL MEDICINE

## 2021-02-02 PROCEDURE — 1159F MED LIST DOCD IN RCRD: CPT | Mod: S$GLB,,, | Performed by: INTERNAL MEDICINE

## 2021-02-09 ENCOUNTER — IMMUNIZATION (OUTPATIENT)
Dept: PRIMARY CARE CLINIC | Facility: CLINIC | Age: 71
End: 2021-02-09
Payer: MEDICARE

## 2021-02-09 DIAGNOSIS — Z23 NEED FOR VACCINATION: Primary | ICD-10-CM

## 2021-02-09 PROCEDURE — 91300 COVID-19, MRNA, LNP-S, PF, 30 MCG/0.3 ML DOSE VACCINE: ICD-10-PCS | Mod: S$GLB,,, | Performed by: FAMILY MEDICINE

## 2021-02-09 PROCEDURE — 0001A COVID-19, MRNA, LNP-S, PF, 30 MCG/0.3 ML DOSE VACCINE: CPT | Mod: S$GLB,,, | Performed by: FAMILY MEDICINE

## 2021-02-09 PROCEDURE — 91300 COVID-19, MRNA, LNP-S, PF, 30 MCG/0.3 ML DOSE VACCINE: CPT | Mod: S$GLB,,, | Performed by: FAMILY MEDICINE

## 2021-02-09 PROCEDURE — 0001A COVID-19, MRNA, LNP-S, PF, 30 MCG/0.3 ML DOSE VACCINE: ICD-10-PCS | Mod: S$GLB,,, | Performed by: FAMILY MEDICINE

## 2021-03-03 ENCOUNTER — IMMUNIZATION (OUTPATIENT)
Dept: PRIMARY CARE CLINIC | Facility: CLINIC | Age: 71
End: 2021-03-03

## 2021-03-03 DIAGNOSIS — Z23 NEED FOR VACCINATION: Primary | ICD-10-CM

## 2021-03-03 PROCEDURE — 91300 COVID-19, MRNA, LNP-S, PF, 30 MCG/0.3 ML DOSE VACCINE: ICD-10-PCS | Mod: S$GLB,,, | Performed by: FAMILY MEDICINE

## 2021-03-03 PROCEDURE — 91300 COVID-19, MRNA, LNP-S, PF, 30 MCG/0.3 ML DOSE VACCINE: CPT | Mod: S$GLB,,, | Performed by: FAMILY MEDICINE

## 2021-03-03 PROCEDURE — 0002A COVID-19, MRNA, LNP-S, PF, 30 MCG/0.3 ML DOSE VACCINE: ICD-10-PCS | Mod: CV19,S$GLB,, | Performed by: FAMILY MEDICINE

## 2021-03-03 PROCEDURE — 0002A COVID-19, MRNA, LNP-S, PF, 30 MCG/0.3 ML DOSE VACCINE: CPT | Mod: CV19,S$GLB,, | Performed by: FAMILY MEDICINE

## 2021-03-09 ENCOUNTER — HOSPITAL ENCOUNTER (OUTPATIENT)
Dept: RADIOLOGY | Facility: HOSPITAL | Age: 71
Discharge: HOME | End: 2021-03-09
Attending: FAMILY MEDICINE
Payer: MEDICARE

## 2021-03-09 DIAGNOSIS — Z78.0 MENOPAUSE: ICD-10-CM

## 2021-03-09 DIAGNOSIS — Z12.31 ENCOUNTER FOR SCREENING MAMMOGRAM FOR MALIGNANT NEOPLASM OF BREAST: ICD-10-CM

## 2021-03-09 PROCEDURE — 77067 SCR MAMMO BI INCL CAD: CPT | Mod: TC,PO

## 2021-03-09 PROCEDURE — 77080 DXA BONE DENSITY AXIAL: CPT | Mod: TC,PO

## 2021-03-19 RX ORDER — EVOLOCUMAB 140 MG/ML
140 INJECTION, SOLUTION SUBCUTANEOUS
Qty: 2 SYRINGE | Refills: 3 | Status: SHIPPED | OUTPATIENT
Start: 2021-03-19 | End: 2021-07-06

## 2021-07-11 ENCOUNTER — PATIENT MESSAGE (OUTPATIENT)
Dept: FAMILY MEDICINE | Facility: CLINIC | Age: 71
End: 2021-07-11

## 2021-07-30 ENCOUNTER — TELEPHONE (OUTPATIENT)
Dept: CARDIOLOGY | Facility: CLINIC | Age: 71
End: 2021-07-30

## 2021-09-21 ENCOUNTER — LAB VISIT (OUTPATIENT)
Dept: LAB | Facility: HOSPITAL | Age: 71
End: 2021-09-21
Attending: FAMILY MEDICINE
Payer: MEDICARE

## 2021-09-21 DIAGNOSIS — E78.00 PURE HYPERCHOLESTEROLEMIA: ICD-10-CM

## 2021-09-21 DIAGNOSIS — E03.9 ACQUIRED HYPOTHYROIDISM: ICD-10-CM

## 2021-09-21 DIAGNOSIS — K75.81 NASH (NONALCOHOLIC STEATOHEPATITIS): ICD-10-CM

## 2021-09-21 LAB
ALBUMIN SERPL BCP-MCNC: 3.9 G/DL (ref 3.5–5.2)
ALP SERPL-CCNC: 160 U/L (ref 55–135)
ALT SERPL W/O P-5'-P-CCNC: 67 U/L (ref 10–44)
ANION GAP SERPL CALC-SCNC: 11 MMOL/L (ref 8–16)
AST SERPL-CCNC: 57 U/L (ref 10–40)
BILIRUB SERPL-MCNC: 0.7 MG/DL (ref 0.1–1)
BUN SERPL-MCNC: 21 MG/DL (ref 8–23)
CALCIUM SERPL-MCNC: 9.6 MG/DL (ref 8.7–10.5)
CHLORIDE SERPL-SCNC: 105 MMOL/L (ref 95–110)
CHOLEST SERPL-MCNC: 218 MG/DL (ref 120–199)
CHOLEST/HDLC SERPL: 4 {RATIO} (ref 2–5)
CO2 SERPL-SCNC: 25 MMOL/L (ref 23–29)
CREAT SERPL-MCNC: 1.1 MG/DL (ref 0.5–1.4)
EST. GFR  (AFRICAN AMERICAN): 58.4 ML/MIN/1.73 M^2
EST. GFR  (NON AFRICAN AMERICAN): 50.6 ML/MIN/1.73 M^2
GLUCOSE SERPL-MCNC: 106 MG/DL (ref 70–110)
HDLC SERPL-MCNC: 54 MG/DL (ref 40–75)
HDLC SERPL: 24.8 % (ref 20–50)
LDLC SERPL CALC-MCNC: 142.8 MG/DL (ref 63–159)
NONHDLC SERPL-MCNC: 164 MG/DL
POTASSIUM SERPL-SCNC: 4.4 MMOL/L (ref 3.5–5.1)
PROT SERPL-MCNC: 8 G/DL (ref 6–8.4)
SODIUM SERPL-SCNC: 141 MMOL/L (ref 136–145)
TRIGL SERPL-MCNC: 106 MG/DL (ref 30–150)
TSH SERPL DL<=0.005 MIU/L-ACNC: 2.75 UIU/ML (ref 0.34–5.6)

## 2021-09-21 PROCEDURE — 84443 ASSAY THYROID STIM HORMONE: CPT | Performed by: FAMILY MEDICINE

## 2021-09-21 PROCEDURE — 80061 LIPID PANEL: CPT | Performed by: FAMILY MEDICINE

## 2021-09-21 PROCEDURE — 80053 COMPREHEN METABOLIC PANEL: CPT | Performed by: FAMILY MEDICINE

## 2021-09-21 PROCEDURE — 36415 COLL VENOUS BLD VENIPUNCTURE: CPT | Performed by: FAMILY MEDICINE

## 2021-09-22 ENCOUNTER — TELEPHONE (OUTPATIENT)
Dept: FAMILY MEDICINE | Facility: CLINIC | Age: 71
End: 2021-09-22

## 2021-09-28 ENCOUNTER — PATIENT MESSAGE (OUTPATIENT)
Dept: CARDIOLOGY | Facility: CLINIC | Age: 71
End: 2021-09-28

## 2021-10-05 ENCOUNTER — OFFICE VISIT (OUTPATIENT)
Dept: CARDIOLOGY | Facility: CLINIC | Age: 71
End: 2021-10-05
Payer: MEDICARE

## 2021-10-05 VITALS
DIASTOLIC BLOOD PRESSURE: 70 MMHG | BODY MASS INDEX: 37.5 KG/M2 | SYSTOLIC BLOOD PRESSURE: 132 MMHG | HEART RATE: 61 BPM | OXYGEN SATURATION: 98 % | HEIGHT: 60 IN | WEIGHT: 191 LBS

## 2021-10-05 DIAGNOSIS — E78.2 MIXED HYPERLIPIDEMIA: ICD-10-CM

## 2021-10-05 DIAGNOSIS — R79.89 ABNORMAL LIVER FUNCTION TESTS: ICD-10-CM

## 2021-10-05 DIAGNOSIS — R00.1 BRADYCARDIA, SINUS: ICD-10-CM

## 2021-10-05 DIAGNOSIS — I10 PRIMARY HYPERTENSION: ICD-10-CM

## 2021-10-05 DIAGNOSIS — N28.9 ACUTE RENAL INSUFFICIENCY: ICD-10-CM

## 2021-10-05 DIAGNOSIS — Z95.1 HISTORY OF CORONARY ARTERY BYPASS SURGERY: ICD-10-CM

## 2021-10-05 PROCEDURE — 3078F DIAST BP <80 MM HG: CPT | Mod: S$GLB,,, | Performed by: INTERNAL MEDICINE

## 2021-10-05 PROCEDURE — 3008F BODY MASS INDEX DOCD: CPT | Mod: S$GLB,,, | Performed by: INTERNAL MEDICINE

## 2021-10-05 PROCEDURE — 1126F PR PAIN SEVERITY QUANTIFIED, NO PAIN PRESENT: ICD-10-PCS | Mod: S$GLB,,, | Performed by: INTERNAL MEDICINE

## 2021-10-05 PROCEDURE — 99213 OFFICE O/P EST LOW 20 MIN: CPT | Mod: S$GLB,,, | Performed by: INTERNAL MEDICINE

## 2021-10-05 PROCEDURE — 99213 PR OFFICE/OUTPT VISIT, EST, LEVL III, 20-29 MIN: ICD-10-PCS | Mod: S$GLB,,, | Performed by: INTERNAL MEDICINE

## 2021-10-05 PROCEDURE — 3075F PR MOST RECENT SYSTOLIC BLOOD PRESS GE 130-139MM HG: ICD-10-PCS | Mod: S$GLB,,, | Performed by: INTERNAL MEDICINE

## 2021-10-05 PROCEDURE — 3008F PR BODY MASS INDEX (BMI) DOCUMENTED: ICD-10-PCS | Mod: S$GLB,,, | Performed by: INTERNAL MEDICINE

## 2021-10-05 PROCEDURE — 1101F PR PT FALLS ASSESS DOC 0-1 FALLS W/OUT INJ PAST YR: ICD-10-PCS | Mod: S$GLB,,, | Performed by: INTERNAL MEDICINE

## 2021-10-05 PROCEDURE — 4010F PR ACE/ARB THEARPY RXD/TAKEN: ICD-10-PCS | Mod: S$GLB,,, | Performed by: INTERNAL MEDICINE

## 2021-10-05 PROCEDURE — 3288F PR FALLS RISK ASSESSMENT DOCUMENTED: ICD-10-PCS | Mod: S$GLB,,, | Performed by: INTERNAL MEDICINE

## 2021-10-05 PROCEDURE — 1101F PT FALLS ASSESS-DOCD LE1/YR: CPT | Mod: S$GLB,,, | Performed by: INTERNAL MEDICINE

## 2021-10-05 PROCEDURE — 1126F AMNT PAIN NOTED NONE PRSNT: CPT | Mod: S$GLB,,, | Performed by: INTERNAL MEDICINE

## 2021-10-05 PROCEDURE — 3078F PR MOST RECENT DIASTOLIC BLOOD PRESSURE < 80 MM HG: ICD-10-PCS | Mod: S$GLB,,, | Performed by: INTERNAL MEDICINE

## 2021-10-05 PROCEDURE — 4010F ACE/ARB THERAPY RXD/TAKEN: CPT | Mod: S$GLB,,, | Performed by: INTERNAL MEDICINE

## 2021-10-05 PROCEDURE — 3075F SYST BP GE 130 - 139MM HG: CPT | Mod: S$GLB,,, | Performed by: INTERNAL MEDICINE

## 2021-10-05 PROCEDURE — 3288F FALL RISK ASSESSMENT DOCD: CPT | Mod: S$GLB,,, | Performed by: INTERNAL MEDICINE

## 2021-10-14 ENCOUNTER — TELEPHONE (OUTPATIENT)
Dept: CARDIOLOGY | Facility: CLINIC | Age: 71
End: 2021-10-14
Payer: MEDICARE

## 2021-10-28 ENCOUNTER — OFFICE VISIT (OUTPATIENT)
Dept: FAMILY MEDICINE | Facility: CLINIC | Age: 71
End: 2021-10-28
Payer: MEDICARE

## 2021-10-28 VITALS
TEMPERATURE: 99 F | SYSTOLIC BLOOD PRESSURE: 130 MMHG | OXYGEN SATURATION: 96 % | DIASTOLIC BLOOD PRESSURE: 78 MMHG | BODY MASS INDEX: 37.62 KG/M2 | HEIGHT: 60 IN | WEIGHT: 191.63 LBS | HEART RATE: 66 BPM

## 2021-10-28 DIAGNOSIS — Z23 NEED FOR INFLUENZA VACCINATION: Primary | ICD-10-CM

## 2021-10-28 DIAGNOSIS — E78.2 MIXED HYPERLIPIDEMIA: ICD-10-CM

## 2021-10-28 DIAGNOSIS — I10 PRIMARY HYPERTENSION: ICD-10-CM

## 2021-10-28 PROCEDURE — 1126F PR PAIN SEVERITY QUANTIFIED, NO PAIN PRESENT: ICD-10-PCS | Mod: S$GLB,,, | Performed by: FAMILY MEDICINE

## 2021-10-28 PROCEDURE — 3288F PR FALLS RISK ASSESSMENT DOCUMENTED: ICD-10-PCS | Mod: S$GLB,,, | Performed by: FAMILY MEDICINE

## 2021-10-28 PROCEDURE — 3078F DIAST BP <80 MM HG: CPT | Mod: S$GLB,,, | Performed by: FAMILY MEDICINE

## 2021-10-28 PROCEDURE — 1159F PR MEDICATION LIST DOCUMENTED IN MEDICAL RECORD: ICD-10-PCS | Mod: S$GLB,,, | Performed by: FAMILY MEDICINE

## 2021-10-28 PROCEDURE — 3075F PR MOST RECENT SYSTOLIC BLOOD PRESS GE 130-139MM HG: ICD-10-PCS | Mod: S$GLB,,, | Performed by: FAMILY MEDICINE

## 2021-10-28 PROCEDURE — 3008F PR BODY MASS INDEX (BMI) DOCUMENTED: ICD-10-PCS | Mod: S$GLB,,, | Performed by: FAMILY MEDICINE

## 2021-10-28 PROCEDURE — 4010F PR ACE/ARB THEARPY RXD/TAKEN: ICD-10-PCS | Mod: S$GLB,,, | Performed by: FAMILY MEDICINE

## 2021-10-28 PROCEDURE — 4010F ACE/ARB THERAPY RXD/TAKEN: CPT | Mod: S$GLB,,, | Performed by: FAMILY MEDICINE

## 2021-10-28 PROCEDURE — 99214 OFFICE O/P EST MOD 30 MIN: CPT | Mod: 25,S$GLB,, | Performed by: FAMILY MEDICINE

## 2021-10-28 PROCEDURE — 99214 PR OFFICE/OUTPT VISIT, EST, LEVL IV, 30-39 MIN: ICD-10-PCS | Mod: 25,S$GLB,, | Performed by: FAMILY MEDICINE

## 2021-10-28 PROCEDURE — 1159F MED LIST DOCD IN RCRD: CPT | Mod: S$GLB,,, | Performed by: FAMILY MEDICINE

## 2021-10-28 PROCEDURE — 3078F PR MOST RECENT DIASTOLIC BLOOD PRESSURE < 80 MM HG: ICD-10-PCS | Mod: S$GLB,,, | Performed by: FAMILY MEDICINE

## 2021-10-28 PROCEDURE — 3288F FALL RISK ASSESSMENT DOCD: CPT | Mod: S$GLB,,, | Performed by: FAMILY MEDICINE

## 2021-10-28 PROCEDURE — G0008 FLU VACCINE - QUADRIVALENT - HIGH DOSE (65+) PRESERVATIVE FREE IM: ICD-10-PCS | Mod: S$GLB,,, | Performed by: FAMILY MEDICINE

## 2021-10-28 PROCEDURE — 1126F AMNT PAIN NOTED NONE PRSNT: CPT | Mod: S$GLB,,, | Performed by: FAMILY MEDICINE

## 2021-10-28 PROCEDURE — 90662 IIV NO PRSV INCREASED AG IM: CPT | Mod: S$GLB,,, | Performed by: FAMILY MEDICINE

## 2021-10-28 PROCEDURE — 3008F BODY MASS INDEX DOCD: CPT | Mod: S$GLB,,, | Performed by: FAMILY MEDICINE

## 2021-10-28 PROCEDURE — 1101F PT FALLS ASSESS-DOCD LE1/YR: CPT | Mod: S$GLB,,, | Performed by: FAMILY MEDICINE

## 2021-10-28 PROCEDURE — 90662 FLU VACCINE - QUADRIVALENT - HIGH DOSE (65+) PRESERVATIVE FREE IM: ICD-10-PCS | Mod: S$GLB,,, | Performed by: FAMILY MEDICINE

## 2021-10-28 PROCEDURE — 3075F SYST BP GE 130 - 139MM HG: CPT | Mod: S$GLB,,, | Performed by: FAMILY MEDICINE

## 2021-10-28 PROCEDURE — 1101F PR PT FALLS ASSESS DOC 0-1 FALLS W/OUT INJ PAST YR: ICD-10-PCS | Mod: S$GLB,,, | Performed by: FAMILY MEDICINE

## 2021-10-28 PROCEDURE — G0008 ADMIN INFLUENZA VIRUS VAC: HCPCS | Mod: S$GLB,,, | Performed by: FAMILY MEDICINE

## 2021-10-28 RX ORDER — EZETIMIBE 10 MG/1
10 TABLET ORAL DAILY
Qty: 90 TABLET | Refills: 3 | Status: SHIPPED | OUTPATIENT
Start: 2021-10-28 | End: 2022-04-12

## 2021-11-15 ENCOUNTER — OFFICE VISIT (OUTPATIENT)
Dept: FAMILY MEDICINE | Facility: CLINIC | Age: 71
End: 2021-11-15
Payer: MEDICARE

## 2021-11-15 VITALS
HEIGHT: 60 IN | DIASTOLIC BLOOD PRESSURE: 74 MMHG | BODY MASS INDEX: 37.92 KG/M2 | WEIGHT: 193.13 LBS | SYSTOLIC BLOOD PRESSURE: 120 MMHG | OXYGEN SATURATION: 95 % | HEART RATE: 62 BPM | TEMPERATURE: 99 F

## 2021-11-15 DIAGNOSIS — L57.0 ACTINIC KERATOSIS: Primary | ICD-10-CM

## 2021-11-15 PROCEDURE — 4010F ACE/ARB THERAPY RXD/TAKEN: CPT | Mod: S$GLB,,, | Performed by: FAMILY MEDICINE

## 2021-11-15 PROCEDURE — 1101F PR PT FALLS ASSESS DOC 0-1 FALLS W/OUT INJ PAST YR: ICD-10-PCS | Mod: S$GLB,,, | Performed by: FAMILY MEDICINE

## 2021-11-15 PROCEDURE — 99213 OFFICE O/P EST LOW 20 MIN: CPT | Mod: 25,S$GLB,, | Performed by: FAMILY MEDICINE

## 2021-11-15 PROCEDURE — 17000 DESTRUCT PREMALG LESION: CPT | Mod: S$GLB,,, | Performed by: FAMILY MEDICINE

## 2021-11-15 PROCEDURE — 3008F PR BODY MASS INDEX (BMI) DOCUMENTED: ICD-10-PCS | Mod: S$GLB,,, | Performed by: FAMILY MEDICINE

## 2021-11-15 PROCEDURE — 1159F PR MEDICATION LIST DOCUMENTED IN MEDICAL RECORD: ICD-10-PCS | Mod: S$GLB,,, | Performed by: FAMILY MEDICINE

## 2021-11-15 PROCEDURE — 1126F PR PAIN SEVERITY QUANTIFIED, NO PAIN PRESENT: ICD-10-PCS | Mod: S$GLB,,, | Performed by: FAMILY MEDICINE

## 2021-11-15 PROCEDURE — 4010F PR ACE/ARB THEARPY RXD/TAKEN: ICD-10-PCS | Mod: S$GLB,,, | Performed by: FAMILY MEDICINE

## 2021-11-15 PROCEDURE — 1126F AMNT PAIN NOTED NONE PRSNT: CPT | Mod: S$GLB,,, | Performed by: FAMILY MEDICINE

## 2021-11-15 PROCEDURE — 99213 PR OFFICE/OUTPT VISIT, EST, LEVL III, 20-29 MIN: ICD-10-PCS | Mod: 25,S$GLB,, | Performed by: FAMILY MEDICINE

## 2021-11-15 PROCEDURE — 3288F PR FALLS RISK ASSESSMENT DOCUMENTED: ICD-10-PCS | Mod: S$GLB,,, | Performed by: FAMILY MEDICINE

## 2021-11-15 PROCEDURE — 17000 DESTRUCTION, BENIGN LESION: ICD-10-PCS | Mod: S$GLB,,, | Performed by: FAMILY MEDICINE

## 2021-11-15 PROCEDURE — 3288F FALL RISK ASSESSMENT DOCD: CPT | Mod: S$GLB,,, | Performed by: FAMILY MEDICINE

## 2021-11-15 PROCEDURE — 3008F BODY MASS INDEX DOCD: CPT | Mod: S$GLB,,, | Performed by: FAMILY MEDICINE

## 2021-11-15 PROCEDURE — 1159F MED LIST DOCD IN RCRD: CPT | Mod: S$GLB,,, | Performed by: FAMILY MEDICINE

## 2021-11-15 PROCEDURE — 1101F PT FALLS ASSESS-DOCD LE1/YR: CPT | Mod: S$GLB,,, | Performed by: FAMILY MEDICINE

## 2021-11-22 ENCOUNTER — PATIENT MESSAGE (OUTPATIENT)
Dept: FAMILY MEDICINE | Facility: CLINIC | Age: 71
End: 2021-11-22
Payer: MEDICARE

## 2022-03-24 ENCOUNTER — PATIENT MESSAGE (OUTPATIENT)
Dept: CARDIOLOGY | Facility: CLINIC | Age: 72
End: 2022-03-24
Payer: MEDICARE

## 2022-04-12 ENCOUNTER — OFFICE VISIT (OUTPATIENT)
Dept: CARDIOLOGY | Facility: CLINIC | Age: 72
End: 2022-04-12
Payer: MEDICARE

## 2022-04-12 VITALS
HEART RATE: 52 BPM | HEIGHT: 60 IN | DIASTOLIC BLOOD PRESSURE: 80 MMHG | WEIGHT: 194 LBS | BODY MASS INDEX: 38.09 KG/M2 | SYSTOLIC BLOOD PRESSURE: 130 MMHG | OXYGEN SATURATION: 98 %

## 2022-04-12 DIAGNOSIS — R53.83 FATIGUE, UNSPECIFIED TYPE: ICD-10-CM

## 2022-04-12 DIAGNOSIS — R00.1 BRADYCARDIA, SINUS: Primary | ICD-10-CM

## 2022-04-12 DIAGNOSIS — I25.10 ATHEROSCLEROSIS OF NATIVE CORONARY ARTERY OF NATIVE HEART WITHOUT ANGINA PECTORIS: ICD-10-CM

## 2022-04-12 DIAGNOSIS — E78.2 MIXED HYPERLIPIDEMIA: ICD-10-CM

## 2022-04-12 DIAGNOSIS — R79.89 ABNORMAL LIVER FUNCTION TESTS: ICD-10-CM

## 2022-04-12 PROCEDURE — 3288F PR FALLS RISK ASSESSMENT DOCUMENTED: ICD-10-PCS | Mod: CPTII,S$GLB,, | Performed by: INTERNAL MEDICINE

## 2022-04-12 PROCEDURE — 1159F PR MEDICATION LIST DOCUMENTED IN MEDICAL RECORD: ICD-10-PCS | Mod: CPTII,S$GLB,, | Performed by: INTERNAL MEDICINE

## 2022-04-12 PROCEDURE — 99214 PR OFFICE/OUTPT VISIT, EST, LEVL IV, 30-39 MIN: ICD-10-PCS | Mod: S$GLB,,, | Performed by: INTERNAL MEDICINE

## 2022-04-12 PROCEDURE — 1126F PR PAIN SEVERITY QUANTIFIED, NO PAIN PRESENT: ICD-10-PCS | Mod: CPTII,S$GLB,, | Performed by: INTERNAL MEDICINE

## 2022-04-12 PROCEDURE — 3075F PR MOST RECENT SYSTOLIC BLOOD PRESS GE 130-139MM HG: ICD-10-PCS | Mod: CPTII,S$GLB,, | Performed by: INTERNAL MEDICINE

## 2022-04-12 PROCEDURE — 3288F FALL RISK ASSESSMENT DOCD: CPT | Mod: CPTII,S$GLB,, | Performed by: INTERNAL MEDICINE

## 2022-04-12 PROCEDURE — 1160F PR REVIEW ALL MEDS BY PRESCRIBER/CLIN PHARMACIST DOCUMENTED: ICD-10-PCS | Mod: CPTII,S$GLB,, | Performed by: INTERNAL MEDICINE

## 2022-04-12 PROCEDURE — 1159F MED LIST DOCD IN RCRD: CPT | Mod: CPTII,S$GLB,, | Performed by: INTERNAL MEDICINE

## 2022-04-12 PROCEDURE — 4010F ACE/ARB THERAPY RXD/TAKEN: CPT | Mod: CPTII,S$GLB,, | Performed by: INTERNAL MEDICINE

## 2022-04-12 PROCEDURE — 1126F AMNT PAIN NOTED NONE PRSNT: CPT | Mod: CPTII,S$GLB,, | Performed by: INTERNAL MEDICINE

## 2022-04-12 PROCEDURE — 1101F PT FALLS ASSESS-DOCD LE1/YR: CPT | Mod: CPTII,S$GLB,, | Performed by: INTERNAL MEDICINE

## 2022-04-12 PROCEDURE — 99214 OFFICE O/P EST MOD 30 MIN: CPT | Mod: S$GLB,,, | Performed by: INTERNAL MEDICINE

## 2022-04-12 PROCEDURE — 4010F PR ACE/ARB THEARPY RXD/TAKEN: ICD-10-PCS | Mod: CPTII,S$GLB,, | Performed by: INTERNAL MEDICINE

## 2022-04-12 PROCEDURE — 3079F DIAST BP 80-89 MM HG: CPT | Mod: CPTII,S$GLB,, | Performed by: INTERNAL MEDICINE

## 2022-04-12 PROCEDURE — 3008F BODY MASS INDEX DOCD: CPT | Mod: CPTII,S$GLB,, | Performed by: INTERNAL MEDICINE

## 2022-04-12 PROCEDURE — 3008F PR BODY MASS INDEX (BMI) DOCUMENTED: ICD-10-PCS | Mod: CPTII,S$GLB,, | Performed by: INTERNAL MEDICINE

## 2022-04-12 PROCEDURE — 1101F PR PT FALLS ASSESS DOC 0-1 FALLS W/OUT INJ PAST YR: ICD-10-PCS | Mod: CPTII,S$GLB,, | Performed by: INTERNAL MEDICINE

## 2022-04-12 PROCEDURE — 3079F PR MOST RECENT DIASTOLIC BLOOD PRESSURE 80-89 MM HG: ICD-10-PCS | Mod: CPTII,S$GLB,, | Performed by: INTERNAL MEDICINE

## 2022-04-12 PROCEDURE — 3075F SYST BP GE 130 - 139MM HG: CPT | Mod: CPTII,S$GLB,, | Performed by: INTERNAL MEDICINE

## 2022-04-12 PROCEDURE — 1160F RVW MEDS BY RX/DR IN RCRD: CPT | Mod: CPTII,S$GLB,, | Performed by: INTERNAL MEDICINE

## 2022-04-12 NOTE — PROGRESS NOTES
Patient ID:  Elena Wells is a 71 y.o. female who presents for follow-up of Coronary Artery Disease, Hyperlipidemia, and Hypertension      She has been doing fine she denies any chest pains any shortness of breath.  At times she feels a little bit dizzy.  She can tell that her pulse is low whenever she feels that way.  She has never had a Holter monitor done.  She has history of chronic bradycardia.      Past Medical History:   Diagnosis Date    Abnormal stress test     Allergy     CAD (coronary artery disease)     Cataract     Fatty liver     Gall stones     Hypercholesteremia     Hypertension     Hypothyroidism     Sleep apnea         Past Surgical History:   Procedure Laterality Date    APPENDECTOMY      CARDIAC CATHETERIZATION      CATARACT EXTRACTION, BILATERAL      CORONARY ANGIOGRAPHY Left 1/2/2020    Procedure: ANGIOGRAM, CORONARY ARTERY;  Surgeon: Kuldeep Moore MD;  Location: [facility] CATH/EP LAB;  Service: Cardiology;  Laterality: Left;    CORONARY ARTERY BYPASS GRAFT  2012    [id number]    CORONARY BYPASS GRAFT ANGIOGRAPHY  1/2/2020    Procedure: Bypass graft study;  Surgeon: Kuldeep Moore MD;  Location: [facility] CATH/EP LAB;  Service: Cardiology;;    HYSTERECTOMY            Current Outpatient Medications   Medication Instructions    aspirin (ECOTRIN) 325 MG EC tablet 1 tablet, Oral, Daily    cetirizine (ZYRTEC) 10 MG tablet 1 tablet, Oral, Daily    levothyroxine (SYNTHROID) 50 MCG tablet TAKE ONE TABLET BY MOUTH ONCE A DAY    losartan (COZAAR) 50 MG tablet TAKE ONE TABLET BY MOUTH ONCE A DAY    REPATHA SURECLICK 140 mg/mL PnIj ADMINISTER 1 ML(140 MG) UNDER THE SKIN EVERY 14 DAYS        Review of patient's allergies indicates:   Allergen Reactions    Codeine Other (See Comments)     Hypotension      Nalbuphine Other (See Comments)     Hypotension    Atorvastatin      Other reaction(s): intolerance; couldn't sleep; elevated liver enzymes    Ezetimibe      Other  reaction(s): intolerance    Pravastatin      Other reaction(s): elevated liver enzymes    Ramipril      Other reaction(s): rash    Rosuvastatin      Other reaction(s): intolerance- elevated liver enzymes    Simvastatin      Other reaction(s): rash        Review of Systems   Cardiovascular: Negative for chest pain, dyspnea on exertion, irregular heartbeat and leg swelling.   Respiratory: Negative for cough and shortness of breath.    Hematologic/Lymphatic: Negative for bleeding problem. Does not bruise/bleed easily.   Gastrointestinal: Negative for melena.   Neurological: Positive for weakness.        Objective:     Vitals:    04/12/22 0902   BP: 130/80   BP Location: Left arm   Patient Position: Sitting   BP Method: Medium (Manual)   Pulse: (!) 52   SpO2: 98%   Weight: 88 kg (194 lb)   Height: 5' (1.524 m)       Physical Exam  Vitals and nursing note reviewed.   Constitutional:       Appearance: She is well-developed.      Comments: Over wt   HENT:      Head: Normocephalic and atraumatic.   Eyes:      Conjunctiva/sclera: Conjunctivae normal.   Cardiovascular:      Rate and Rhythm: Normal rate and regular rhythm.      Heart sounds: Normal heart sounds.   Pulmonary:      Effort: Pulmonary effort is normal.      Breath sounds: Normal breath sounds.   Abdominal:      General: Bowel sounds are normal.      Palpations: Abdomen is soft.   Musculoskeletal:         General: Normal range of motion.   Skin:     General: Skin is warm and dry.   Neurological:      Mental Status: She is alert and oriented to person, place, and time.   Psychiatric:         Behavior: Behavior normal.         Thought Content: Thought content normal.         Judgment: Judgment normal.       CMP  Sodium   Date Value Ref Range Status   09/21/2021 141 136 - 145 mmol/L Final   05/06/2019 137 134 - 144 mmol/L      Potassium   Date Value Ref Range Status   09/21/2021 4.4 3.5 - 5.1 mmol/L Final     Chloride   Date Value Ref Range Status   09/21/2021 105  95 - 110 mmol/L Final   05/06/2019 102 98 - 110 mmol/L      CO2   Date Value Ref Range Status   09/21/2021 25 23 - 29 mmol/L Final     Glucose   Date Value Ref Range Status   09/21/2021 106 70 - 110 mg/dL Final   05/06/2019 113 (H) 70 - 99 mg/dL      BUN   Date Value Ref Range Status   09/21/2021 21 8 - 23 mg/dL Final     Creatinine   Date Value Ref Range Status   09/21/2021 1.1 0.5 - 1.4 mg/dL Final   05/06/2019 1.20 0.60 - 1.40 mg/dL      Calcium   Date Value Ref Range Status   09/21/2021 9.6 8.7 - 10.5 mg/dL Final     Total Protein   Date Value Ref Range Status   09/21/2021 8.0 6.0 - 8.4 g/dL Final     Albumin   Date Value Ref Range Status   09/21/2021 3.9 3.5 - 5.2 g/dL Final   05/06/2019 3.7 3.1 - 4.7 g/dL      Total Bilirubin   Date Value Ref Range Status   09/21/2021 0.7 0.1 - 1.0 mg/dL Final     Comment:     For infants and newborns, interpretation of results should be based  on gestational age, weight and in agreement with clinical  observations.    Premature Infant recommended reference ranges:  Up to 24 hours.............<8.0 mg/dL  Up to 48 hours............<12.0 mg/dL  3-5 days..................<15.0 mg/dL  6-29 days.................<15.0 mg/dL       Alkaline Phosphatase   Date Value Ref Range Status   09/21/2021 160 (H) 55 - 135 U/L Final     AST   Date Value Ref Range Status   09/21/2021 57 (H) 10 - 40 U/L Final     ALT   Date Value Ref Range Status   09/21/2021 67 (H) 10 - 44 U/L Final     Anion Gap   Date Value Ref Range Status   09/21/2021 11 8 - 16 mmol/L Final     eGFR if    Date Value Ref Range Status   09/21/2021 58.4 (A) >60 mL/min/1.73 m^2 Final     eGFR if non    Date Value Ref Range Status   09/21/2021 50.6 (A) >60 mL/min/1.73 m^2 Final     Comment:     Calculation used to obtain the estimated glomerular filtration  rate (eGFR) is the CKD-EPI equation.         BMP  Lab Results   Component Value Date     09/21/2021    K 4.4 09/21/2021      09/21/2021    CO2 25 09/21/2021    BUN 21 09/21/2021    CREATININE 1.1 09/21/2021    CALCIUM 9.6 09/21/2021    ANIONGAP 11 09/21/2021    ESTGFRAFRICA 58.4 (A) 09/21/2021    EGFRNONAA 50.6 (A) 09/21/2021      BNP  @LABRCNTIP(BNP,BNPTRIAGEBLO)@   Lab Results   Component Value Date    CHOL 218 (H) 09/21/2021    CHOL 221 (H) 06/24/2020     Lab Results   Component Value Date    HDL 54 09/21/2021    HDL 57 06/24/2020     Lab Results   Component Value Date    LDLCALC 142.8 09/21/2021    LDLCALC 139.8 06/24/2020     Lab Results   Component Value Date    TRIG 106 09/21/2021    TRIG 121 06/24/2020     Lab Results   Component Value Date    CHOLHDL 24.8 09/21/2021    CHOLHDL 25.8 06/24/2020      Lab Results   Component Value Date    TSH 2.750 09/21/2021    FREET4 0.62 05/06/2019     Lab Results   Component Value Date    HGBA1C 6.0 10/12/2020     Lab Results   Component Value Date    WBC 7.01 01/03/2020    HGB 13.6 01/03/2020    HCT 41.7 01/03/2020    MCV 94 01/03/2020     01/03/2020         No results found for this or any previous visit.     Results for orders placed during the hospital encounter of 01/02/20    Cardiac catheterization    Conclusion  · Ost RPDA lesion , 99% stenosed.  · Ost 1st Diag lesion , 80% stenosed.  · Ost 2nd Mrg to 2nd Mrg lesion , 100% stenosed.  · Prox LAD lesion , 50% stenosed.  · Estimated blood loss: <50 mL  · Three vessel coronary artery disease.  · Patent saphenous vein graft to the 1st diagonal, obtuse marginal branch, PDA.  · Patent lima to the distal left anterior descending artery    I certify that I was present for catheter insertion, catheter manipulation, angiography, and angiographic interpretation of this patient.    Procedure Log documented by Documenter: RT Minnie and verified by Kuldeep Moore MD.    Date: 1/7/2020  Time: 10:02 AM         Assessment:       Bradycardia, sinus  Will obtain a Holter monitor to rule out any significant  pauses.    Hyperlipidemia  Controlled on Repatha    Atherosclerosis of native coronary artery of native heart without angina pectoris  No symptoms of angina.  Status post coronary artery bypass.    Fatigue  Will obtain a Holter monitor to see if there is significant pauses    Abnormal liver function tests  Stable will follow doing better       Plan:        Obtain a lipid CMP CBC TSH.  Obtain a Holter monitor to see if there is any long pauses.  She will be seen in the office in 6 months.

## 2022-04-19 ENCOUNTER — TELEPHONE (OUTPATIENT)
Dept: CARDIOLOGY | Facility: CLINIC | Age: 72
End: 2022-04-19
Payer: MEDICARE

## 2022-04-19 NOTE — TELEPHONE ENCOUNTER
----- Message from Saul Tomas MA sent at 4/19/2022  2:12 PM CDT -----  Contact: ADRIÁN JOE [[id number]]  Type: Needs Medical Advice    Who Called: ADRIÁN JOE [[id number]]  Best Call Back Number: [phone]  Inquiry/Question: Would you kindly call ADRIÁN JOE [[id number]] regarding holter monitor  Thank you~

## 2022-06-01 ENCOUNTER — HOSPITAL ENCOUNTER (OUTPATIENT)
Dept: RADIOLOGY | Facility: HOSPITAL | Age: 72
Discharge: HOME | End: 2022-06-01
Attending: OTOLARYNGOLOGY
Payer: MEDICARE

## 2022-06-01 DIAGNOSIS — R05.9 COUGH: Primary | ICD-10-CM

## 2022-06-01 DIAGNOSIS — J32.4 CHRONIC PANSINUSITIS: Primary | ICD-10-CM

## 2022-06-01 DIAGNOSIS — R05.9 COUGH: ICD-10-CM

## 2022-06-01 DIAGNOSIS — J32.4 CHRONIC PANSINUSITIS: ICD-10-CM

## 2022-06-01 PROCEDURE — 70220 X-RAY EXAM OF SINUSES: CPT | Mod: TC,PO

## 2022-06-01 PROCEDURE — 71046 X-RAY EXAM CHEST 2 VIEWS: CPT | Mod: TC,PO

## 2022-06-16 ENCOUNTER — OFFICE VISIT (OUTPATIENT)
Dept: FAMILY MEDICINE | Facility: CLINIC | Age: 72
End: 2022-06-16
Payer: MEDICARE

## 2022-06-16 VITALS
HEART RATE: 65 BPM | HEIGHT: 60 IN | DIASTOLIC BLOOD PRESSURE: 78 MMHG | TEMPERATURE: 98 F | WEIGHT: 189.88 LBS | BODY MASS INDEX: 37.28 KG/M2 | OXYGEN SATURATION: 95 % | SYSTOLIC BLOOD PRESSURE: 128 MMHG

## 2022-06-16 DIAGNOSIS — G47.30 SLEEP APNEA, UNSPECIFIED TYPE: ICD-10-CM

## 2022-06-16 DIAGNOSIS — J30.2 SEASONAL ALLERGIC RHINITIS, UNSPECIFIED TRIGGER: ICD-10-CM

## 2022-06-16 DIAGNOSIS — E03.9 ACQUIRED HYPOTHYROIDISM: ICD-10-CM

## 2022-06-16 DIAGNOSIS — Z12.31 ENCOUNTER FOR SCREENING MAMMOGRAM FOR MALIGNANT NEOPLASM OF BREAST: ICD-10-CM

## 2022-06-16 DIAGNOSIS — I10 PRIMARY HYPERTENSION: Primary | ICD-10-CM

## 2022-06-16 PROCEDURE — 3288F FALL RISK ASSESSMENT DOCD: CPT | Mod: CPTII,S$GLB,, | Performed by: FAMILY MEDICINE

## 2022-06-16 PROCEDURE — 3008F PR BODY MASS INDEX (BMI) DOCUMENTED: ICD-10-PCS | Mod: CPTII,S$GLB,, | Performed by: FAMILY MEDICINE

## 2022-06-16 PROCEDURE — 99214 OFFICE O/P EST MOD 30 MIN: CPT | Mod: S$GLB,,, | Performed by: FAMILY MEDICINE

## 2022-06-16 PROCEDURE — 3078F DIAST BP <80 MM HG: CPT | Mod: CPTII,S$GLB,, | Performed by: FAMILY MEDICINE

## 2022-06-16 PROCEDURE — 99214 PR OFFICE/OUTPT VISIT, EST, LEVL IV, 30-39 MIN: ICD-10-PCS | Mod: S$GLB,,, | Performed by: FAMILY MEDICINE

## 2022-06-16 PROCEDURE — 1125F PR PAIN SEVERITY QUANTIFIED, PAIN PRESENT: ICD-10-PCS | Mod: CPTII,S$GLB,, | Performed by: FAMILY MEDICINE

## 2022-06-16 PROCEDURE — 1159F PR MEDICATION LIST DOCUMENTED IN MEDICAL RECORD: ICD-10-PCS | Mod: CPTII,S$GLB,, | Performed by: FAMILY MEDICINE

## 2022-06-16 PROCEDURE — 1101F PR PT FALLS ASSESS DOC 0-1 FALLS W/OUT INJ PAST YR: ICD-10-PCS | Mod: CPTII,S$GLB,, | Performed by: FAMILY MEDICINE

## 2022-06-16 PROCEDURE — 1125F AMNT PAIN NOTED PAIN PRSNT: CPT | Mod: CPTII,S$GLB,, | Performed by: FAMILY MEDICINE

## 2022-06-16 PROCEDURE — 3288F PR FALLS RISK ASSESSMENT DOCUMENTED: ICD-10-PCS | Mod: CPTII,S$GLB,, | Performed by: FAMILY MEDICINE

## 2022-06-16 PROCEDURE — 3074F SYST BP LT 130 MM HG: CPT | Mod: CPTII,S$GLB,, | Performed by: FAMILY MEDICINE

## 2022-06-16 PROCEDURE — 3008F BODY MASS INDEX DOCD: CPT | Mod: CPTII,S$GLB,, | Performed by: FAMILY MEDICINE

## 2022-06-16 PROCEDURE — 4010F PR ACE/ARB THEARPY RXD/TAKEN: ICD-10-PCS | Mod: CPTII,S$GLB,, | Performed by: FAMILY MEDICINE

## 2022-06-16 PROCEDURE — 3074F PR MOST RECENT SYSTOLIC BLOOD PRESSURE < 130 MM HG: ICD-10-PCS | Mod: CPTII,S$GLB,, | Performed by: FAMILY MEDICINE

## 2022-06-16 PROCEDURE — 1159F MED LIST DOCD IN RCRD: CPT | Mod: CPTII,S$GLB,, | Performed by: FAMILY MEDICINE

## 2022-06-16 PROCEDURE — 1101F PT FALLS ASSESS-DOCD LE1/YR: CPT | Mod: CPTII,S$GLB,, | Performed by: FAMILY MEDICINE

## 2022-06-16 PROCEDURE — 4010F ACE/ARB THERAPY RXD/TAKEN: CPT | Mod: CPTII,S$GLB,, | Performed by: FAMILY MEDICINE

## 2022-06-16 PROCEDURE — 3078F PR MOST RECENT DIASTOLIC BLOOD PRESSURE < 80 MM HG: ICD-10-PCS | Mod: CPTII,S$GLB,, | Performed by: FAMILY MEDICINE

## 2022-06-16 RX ORDER — FLUTICASONE PROPIONATE 50 MCG
1 SPRAY, SUSPENSION (ML) NASAL DAILY
Qty: 16 G | Refills: 0 | Status: SHIPPED | OUTPATIENT
Start: 2022-06-16

## 2022-06-16 RX ORDER — ALBUTEROL SULFATE 90 UG/1
AEROSOL, METERED RESPIRATORY (INHALATION)
COMMUNITY
Start: 2022-06-01

## 2022-06-16 NOTE — PROGRESS NOTES
SUBJECTIVE:    Patient ID: Elena Wells is a 72 y.o. female.    Chief Complaint: Hypertension  73 yo female here today to follow up on HTN, hypo thyroid, she also needs new CPAP supplies, and annual mammogram.  Patient has no new complaints today but will need medications refilled  Her blood pressure is well controlled today on losartan 50 mg, she denies any chest pain shortness breath or dyspnea on exertion.     Patient is currently on Synthroid 50 mcg she has been on the same dose for over a year she is not complaining of any symptoms consistent with either hyper or hypothyroidism her last TSH was over 6 months ago she has 1 pending by Cardiology.      SPMHx:  HTN: Losartan 50mg her blood pressure is well controlled today  Hyperlipidemia: Repatha 140mg every 14 days  Hypothyroid: On Synthroid 50mcg has been on the same dose for 6 months TSH is controlled  Fatty Liver Disease: Was on Repatha but stopped 1 month ago.   CAD: 4 vessel bipass in 2012, has had recent angiogram 2020  Pre-diabetes: fast blood sugar was elevated but not in diabetes range, with an A1c of 6.0.  VERITO: On CPAP not using     Specialists  Cardiologist: Dr Coburn  Pulmonary: Dr Cutler  and thyroid, pt also needs new CPAP supplies   ENT: Dr LORA Segura     Smoke: None  ETOH: None  Exercise: 5 days a week    Hypertension  This is a chronic problem. The current episode started more than 1 year ago. The problem is unchanged. The problem is controlled. Pertinent negatives include no anxiety, blurred vision, chest pain, headaches, malaise/fatigue, neck pain, orthopnea, palpitations, peripheral edema, PND, shortness of breath or sweats. There are no associated agents to hypertension. Risk factors for coronary artery disease include obesity, dyslipidemia, post-menopausal state and sedentary lifestyle. Past treatments include angiotensin blockers. The current treatment provides moderate improvement. Identifiable causes of hypertension include  a thyroid problem.   Thyroid Problem  Presents for follow-up visit. Patient reports no anxiety, cold intolerance, constipation, diaphoresis, diarrhea, dry skin, fatigue, hair loss, heat intolerance, hoarse voice, leg swelling, menstrual problem, palpitations, tremors, weight gain or weight loss. The symptoms have been stable.         Past Medical History:   Diagnosis Date    Abnormal stress test     Allergy     CAD (coronary artery disease)     Cataract     Fatty liver     Gall stones     Hypercholesteremia     Hypertension     Hypothyroidism     Sleep apnea      Social History     Socioeconomic History    Marital status:    Occupational History    Occupation: retired   Tobacco Use    Smoking status: Never Smoker    Smokeless tobacco: Never Used   Substance and Sexual Activity    Alcohol use: No    Drug use: No     Past Surgical History:   Procedure Laterality Date    APPENDECTOMY      CARDIAC CATHETERIZATION      CATARACT EXTRACTION, BILATERAL      CORONARY ANGIOGRAPHY Left 1/2/2020    Procedure: ANGIOGRAM, CORONARY ARTERY;  Surgeon: Kuldeep Moore MD;  Location: [facility] CATH/EP LAB;  Service: Cardiology;  Laterality: Left;    CORONARY ARTERY BYPASS GRAFT  2012    [id number]    CORONARY BYPASS GRAFT ANGIOGRAPHY  1/2/2020    Procedure: Bypass graft study;  Surgeon: Kuldeep Moore MD;  Location: [facility] CATH/EP LAB;  Service: Cardiology;;    HYSTERECTOMY       Family History   Problem Relation Age of Onset    Diabetes Mother     Arthritis Mother     Hypertension Mother     Cancer Father     COPD Father      Current Outpatient Medications   Medication Sig Dispense Refill    aspirin (ECOTRIN) 325 MG EC tablet Take 1 tablet by mouth once daily.      cetirizine (ZYRTEC) 10 MG tablet Take 1 tablet by mouth once daily.      levothyroxine (SYNTHROID) 50 MCG tablet TAKE ONE TABLET BY MOUTH ONCE A DAY 90 tablet 3    losartan (COZAAR) 50 MG tablet TAKE ONE TABLET BY MOUTH ONCE A DAY  90 tablet 3    REPATHA SURECLICK 140 mg/mL PnIj ADMINISTER 1 ML(140 MG) UNDER THE SKIN EVERY 14 DAYS 2 mL 3    albuterol (PROVENTIL/VENTOLIN HFA) 90 mcg/actuation inhaler SMARTSI Puff(s) Via Inhaler 3 Times Daily PRN      fluticasone propionate (FLONASE) 50 mcg/actuation nasal spray 1 spray (50 mcg total) by Each Nostril route once daily. 16 g 0     No current facility-administered medications for this visit.     Review of patient's allergies indicates:   Allergen Reactions    Codeine Other (See Comments)     Hypotension      Nalbuphine Other (See Comments)     Hypotension    Atorvastatin      Other reaction(s): intolerance; couldn't sleep; elevated liver enzymes    Ezetimibe      Other reaction(s): intolerance    Pravastatin      Other reaction(s): elevated liver enzymes    Ramipril      Other reaction(s): rash    Rosuvastatin      Other reaction(s): intolerance- elevated liver enzymes    Simvastatin      Other reaction(s): rash       Review of Systems   Constitutional: Negative for activity change, diaphoresis, fatigue, malaise/fatigue, unexpected weight change, weight gain and weight loss.   HENT: Positive for postnasal drip. Negative for congestion, hearing loss, hoarse voice, rhinorrhea and trouble swallowing.    Eyes: Negative for blurred vision, discharge and visual disturbance.   Respiratory: Positive for cough. Negative for chest tightness, shortness of breath and wheezing.    Cardiovascular: Negative for chest pain, palpitations, orthopnea and PND.   Gastrointestinal: Negative for blood in stool, constipation, diarrhea and vomiting.   Endocrine: Negative for cold intolerance, heat intolerance, polydipsia and polyuria.   Genitourinary: Negative for difficulty urinating, dysuria, hematuria and menstrual problem.   Musculoskeletal: Negative for arthralgias, joint swelling and neck pain.   Neurological: Negative for tremors, weakness and headaches.   Psychiatric/Behavioral: Negative for dysphoric  mood. The patient is not nervous/anxious.           Blood pressure 128/78, pulse 65, temperature 97.8 °F (36.6 °C), temperature source Oral, height 5' (1.524 m), weight 86.1 kg (189 lb 14.4 oz), SpO2 95 %. Body mass index is 37.09 kg/m².   Objective:      Physical Exam  Vitals reviewed.   Constitutional:       General: She is not in acute distress.     Appearance: Normal appearance. She is obese. She is not ill-appearing or toxic-appearing.   HENT:      Head: Normocephalic and atraumatic.      Right Ear: Tympanic membrane normal.      Left Ear: Tympanic membrane normal.      Nose: Congestion and rhinorrhea present.   Eyes:      General: No scleral icterus.     Conjunctiva/sclera: Conjunctivae normal.      Pupils: Pupils are equal, round, and reactive to light.   Cardiovascular:      Rate and Rhythm: Normal rate and regular rhythm.      Heart sounds: Normal heart sounds. No murmur heard.  Pulmonary:      Effort: No respiratory distress.      Breath sounds: Normal breath sounds. No wheezing or rhonchi.   Skin:     General: Skin is warm and dry.   Neurological:      Mental Status: She is alert and oriented to person, place, and time.             Assessment:       1. Primary hypertension    2. Acquired hypothyroidism    3. Seasonal allergic rhinitis, unspecified trigger    4. Sleep apnea, unspecified type    5. Encounter for screening mammogram for malignant neoplasm of breast         Plan:           Primary hypertension  Blood pressure well controlled will continue on her current dose of medications,Reduce the amount of salt in your diet; Lose weight; Avoid drinking too much alcohol; Exercise at least 30 minutes per day most days of the week.  Continue current medications and home BP monitoring.  Acquired hypothyroidism  Last TSH normal she has a TSH pending by Cardiology, she is not complaining of any symptoms consistent with either hyper or hypothyroidism will continue on the same dose of Synthroid    Seasonal  allergic rhinitis, unspecified trigger  -     fluticasone propionate (FLONASE) 50 mcg/actuation nasal spray; 1 spray (50 mcg total) by Each Nostril route once daily.  Dispense: 16 g; Refill: 0    Sleep apnea, unspecified type  -     CPAP/BIPAP SUPPLIES    Encounter for screening mammogram for malignant neoplasm of breast  -     Mammo Digital Screening Bilat w/ Christo; Future; Expected date: 06/16/2022

## 2022-06-30 ENCOUNTER — HOSPITAL ENCOUNTER (OUTPATIENT)
Dept: RADIOLOGY | Facility: HOSPITAL | Age: 72
Discharge: HOME | End: 2022-06-30
Attending: FAMILY MEDICINE
Payer: MEDICARE

## 2022-06-30 VITALS — BODY MASS INDEX: 37.27 KG/M2 | HEIGHT: 60 IN | WEIGHT: 189.81 LBS

## 2022-06-30 DIAGNOSIS — Z12.31 ENCOUNTER FOR SCREENING MAMMOGRAM FOR MALIGNANT NEOPLASM OF BREAST: ICD-10-CM

## 2022-06-30 PROCEDURE — 77063 BREAST TOMOSYNTHESIS BI: CPT | Mod: TC,PO

## 2022-08-23 ENCOUNTER — TELEPHONE (OUTPATIENT)
Dept: CARDIOLOGY | Facility: CLINIC | Age: 72
End: 2022-08-23
Payer: MEDICARE

## 2022-08-23 NOTE — TELEPHONE ENCOUNTER
----- Message from Gael Martinez sent at 8/23/2022  8:02 AM CDT -----  Contact: [phone]  Who Called: PT    Regarding: pt requesting orders for lab work from the portal for her 6 month check up sent to imaging center and to also schedule a appointment in October     Would the patient rather a call back or a response via [facility]? Call back    Best Call Back Number:[phone]    Additional Information: n/a

## 2022-10-06 ENCOUNTER — LAB VISIT (OUTPATIENT)
Dept: LAB | Facility: HOSPITAL | Age: 72
End: 2022-10-06
Attending: INTERNAL MEDICINE
Payer: MEDICARE

## 2022-10-06 DIAGNOSIS — R00.1 BRADYCARDIA, SINUS: ICD-10-CM

## 2022-10-06 DIAGNOSIS — R79.89 ABNORMAL LIVER FUNCTION TESTS: ICD-10-CM

## 2022-10-06 DIAGNOSIS — E78.2 MIXED HYPERLIPIDEMIA: ICD-10-CM

## 2022-10-06 LAB
ALBUMIN SERPL BCP-MCNC: 3.7 G/DL (ref 3.5–5.2)
ALP SERPL-CCNC: 185 U/L (ref 55–135)
ALT SERPL W/O P-5'-P-CCNC: 90 U/L (ref 10–44)
ANION GAP SERPL CALC-SCNC: 8 MMOL/L (ref 8–16)
AST SERPL-CCNC: 83 U/L (ref 10–40)
BILIRUB SERPL-MCNC: 0.6 MG/DL (ref 0.1–1)
BUN SERPL-MCNC: 14 MG/DL (ref 8–23)
CALCIUM SERPL-MCNC: 9.9 MG/DL (ref 8.7–10.5)
CHLORIDE SERPL-SCNC: 105 MMOL/L (ref 95–110)
CHOLEST SERPL-MCNC: 252 MG/DL (ref 120–199)
CHOLEST/HDLC SERPL: 4.1 {RATIO} (ref 2–5)
CO2 SERPL-SCNC: 27 MMOL/L (ref 23–29)
CREAT SERPL-MCNC: 1 MG/DL (ref 0.5–1.4)
ERYTHROCYTE [DISTWIDTH] IN BLOOD BY AUTOMATED COUNT: 13.7 % (ref 11.5–14.5)
EST. GFR  (NO RACE VARIABLE): 59.9 ML/MIN/1.73 M^2
GLUCOSE SERPL-MCNC: 108 MG/DL (ref 70–110)
HCT VFR BLD AUTO: 47.7 % (ref 37–48.5)
HDLC SERPL-MCNC: 61 MG/DL (ref 40–75)
HDLC SERPL: 24.2 % (ref 20–50)
HGB BLD-MCNC: 15.1 G/DL (ref 12–16)
LDLC SERPL CALC-MCNC: 166.6 MG/DL (ref 63–159)
MCH RBC QN AUTO: 30.5 PG (ref 27–31)
MCHC RBC AUTO-ENTMCNC: 31.7 G/DL (ref 32–36)
MCV RBC AUTO: 96 FL (ref 82–98)
NONHDLC SERPL-MCNC: 191 MG/DL
PLATELET # BLD AUTO: 224 K/UL (ref 150–450)
PMV BLD AUTO: 12.7 FL (ref 9.2–12.9)
POTASSIUM SERPL-SCNC: 4.9 MMOL/L (ref 3.5–5.1)
PROT SERPL-MCNC: 7.8 G/DL (ref 6–8.4)
RBC # BLD AUTO: 4.95 M/UL (ref 4–5.4)
SODIUM SERPL-SCNC: 140 MMOL/L (ref 136–145)
TRIGL SERPL-MCNC: 122 MG/DL (ref 30–150)
TSH SERPL DL<=0.005 MIU/L-ACNC: 2.5 UIU/ML (ref 0.34–5.6)
WBC # BLD AUTO: 7.74 K/UL (ref 3.9–12.7)

## 2022-10-06 PROCEDURE — 80053 COMPREHEN METABOLIC PANEL: CPT | Performed by: INTERNAL MEDICINE

## 2022-10-06 PROCEDURE — 36415 COLL VENOUS BLD VENIPUNCTURE: CPT | Performed by: INTERNAL MEDICINE

## 2022-10-06 PROCEDURE — 85027 COMPLETE CBC AUTOMATED: CPT | Performed by: INTERNAL MEDICINE

## 2022-10-06 PROCEDURE — 84443 ASSAY THYROID STIM HORMONE: CPT | Performed by: INTERNAL MEDICINE

## 2022-10-06 PROCEDURE — 80061 LIPID PANEL: CPT | Performed by: INTERNAL MEDICINE

## 2022-10-11 ENCOUNTER — OFFICE VISIT (OUTPATIENT)
Dept: CARDIOLOGY | Facility: CLINIC | Age: 72
End: 2022-10-11
Payer: MEDICARE

## 2022-10-11 VITALS
SYSTOLIC BLOOD PRESSURE: 122 MMHG | HEIGHT: 60 IN | BODY MASS INDEX: 37.89 KG/M2 | WEIGHT: 193 LBS | OXYGEN SATURATION: 96 % | DIASTOLIC BLOOD PRESSURE: 80 MMHG | HEART RATE: 63 BPM

## 2022-10-11 DIAGNOSIS — R00.1 BRADYCARDIA, SINUS: ICD-10-CM

## 2022-10-11 DIAGNOSIS — I10 ESSENTIAL HYPERTENSION: ICD-10-CM

## 2022-10-11 DIAGNOSIS — Z95.1 HISTORY OF CORONARY ARTERY BYPASS SURGERY: ICD-10-CM

## 2022-10-11 DIAGNOSIS — I25.10 ATHEROSCLEROSIS OF NATIVE CORONARY ARTERY OF NATIVE HEART WITHOUT ANGINA PECTORIS: ICD-10-CM

## 2022-10-11 DIAGNOSIS — E78.2 MIXED HYPERLIPIDEMIA: Primary | ICD-10-CM

## 2022-10-11 DIAGNOSIS — Z78.9 STATIN INTOLERANCE: ICD-10-CM

## 2022-10-11 PROCEDURE — 3079F PR MOST RECENT DIASTOLIC BLOOD PRESSURE 80-89 MM HG: ICD-10-PCS | Mod: CPTII,S$GLB,, | Performed by: INTERNAL MEDICINE

## 2022-10-11 PROCEDURE — 3079F DIAST BP 80-89 MM HG: CPT | Mod: CPTII,S$GLB,, | Performed by: INTERNAL MEDICINE

## 2022-10-11 PROCEDURE — 3288F PR FALLS RISK ASSESSMENT DOCUMENTED: ICD-10-PCS | Mod: CPTII,S$GLB,, | Performed by: INTERNAL MEDICINE

## 2022-10-11 PROCEDURE — 1160F RVW MEDS BY RX/DR IN RCRD: CPT | Mod: CPTII,S$GLB,, | Performed by: INTERNAL MEDICINE

## 2022-10-11 PROCEDURE — 1126F AMNT PAIN NOTED NONE PRSNT: CPT | Mod: CPTII,S$GLB,, | Performed by: INTERNAL MEDICINE

## 2022-10-11 PROCEDURE — 1159F PR MEDICATION LIST DOCUMENTED IN MEDICAL RECORD: ICD-10-PCS | Mod: CPTII,S$GLB,, | Performed by: INTERNAL MEDICINE

## 2022-10-11 PROCEDURE — 1126F PR PAIN SEVERITY QUANTIFIED, NO PAIN PRESENT: ICD-10-PCS | Mod: CPTII,S$GLB,, | Performed by: INTERNAL MEDICINE

## 2022-10-11 PROCEDURE — 99213 OFFICE O/P EST LOW 20 MIN: CPT | Mod: S$GLB,,, | Performed by: INTERNAL MEDICINE

## 2022-10-11 PROCEDURE — 1159F MED LIST DOCD IN RCRD: CPT | Mod: CPTII,S$GLB,, | Performed by: INTERNAL MEDICINE

## 2022-10-11 PROCEDURE — 1160F PR REVIEW ALL MEDS BY PRESCRIBER/CLIN PHARMACIST DOCUMENTED: ICD-10-PCS | Mod: CPTII,S$GLB,, | Performed by: INTERNAL MEDICINE

## 2022-10-11 PROCEDURE — 4010F ACE/ARB THERAPY RXD/TAKEN: CPT | Mod: CPTII,S$GLB,, | Performed by: INTERNAL MEDICINE

## 2022-10-11 PROCEDURE — 1101F PR PT FALLS ASSESS DOC 0-1 FALLS W/OUT INJ PAST YR: ICD-10-PCS | Mod: CPTII,S$GLB,, | Performed by: INTERNAL MEDICINE

## 2022-10-11 PROCEDURE — 99213 PR OFFICE/OUTPT VISIT, EST, LEVL III, 20-29 MIN: ICD-10-PCS | Mod: S$GLB,,, | Performed by: INTERNAL MEDICINE

## 2022-10-11 PROCEDURE — 3074F PR MOST RECENT SYSTOLIC BLOOD PRESSURE < 130 MM HG: ICD-10-PCS | Mod: CPTII,S$GLB,, | Performed by: INTERNAL MEDICINE

## 2022-10-11 PROCEDURE — 3074F SYST BP LT 130 MM HG: CPT | Mod: CPTII,S$GLB,, | Performed by: INTERNAL MEDICINE

## 2022-10-11 PROCEDURE — 1101F PT FALLS ASSESS-DOCD LE1/YR: CPT | Mod: CPTII,S$GLB,, | Performed by: INTERNAL MEDICINE

## 2022-10-11 PROCEDURE — 4010F PR ACE/ARB THEARPY RXD/TAKEN: ICD-10-PCS | Mod: CPTII,S$GLB,, | Performed by: INTERNAL MEDICINE

## 2022-10-11 PROCEDURE — 3008F PR BODY MASS INDEX (BMI) DOCUMENTED: ICD-10-PCS | Mod: CPTII,S$GLB,, | Performed by: INTERNAL MEDICINE

## 2022-10-11 PROCEDURE — 3008F BODY MASS INDEX DOCD: CPT | Mod: CPTII,S$GLB,, | Performed by: INTERNAL MEDICINE

## 2022-10-11 PROCEDURE — 3288F FALL RISK ASSESSMENT DOCD: CPT | Mod: CPTII,S$GLB,, | Performed by: INTERNAL MEDICINE

## 2022-10-11 NOTE — PROGRESS NOTES
Patient ID:  Elena Wells is a 72 y.o. female who presents for follow-up of Coronary Artery Disease, Hypertension, Hyperlipidemia, and Results (labs)      She has been doing very well other than sinus problems.  She has been on amoxicillin.  She denies any chest pains any shortness of breath.  She was getting Repatha through a grand which have run out.  By in it through her insurance is 90 dollars a month.  She is going to limited income with a price of groceries and food she cannot afford the Repatha.      Past Medical History:   Diagnosis Date    Abnormal stress test     Allergy     CAD (coronary artery disease)     Cataract     Fatty liver     Gall stones     Hypercholesteremia     Hypertension     Hypothyroidism     Sleep apnea         Past Surgical History:   Procedure Laterality Date    APPENDECTOMY      CARDIAC CATHETERIZATION      CATARACT EXTRACTION, BILATERAL      CORONARY ANGIOGRAPHY Left 2020    Procedure: ANGIOGRAM, CORONARY ARTERY;  Surgeon: Kuldeep Moore MD;  Location: [facility] CATH/EP LAB;  Service: Cardiology;  Laterality: Left;    CORONARY ARTERY BYPASS GRAFT      [id number]    CORONARY BYPASS GRAFT ANGIOGRAPHY  2020    Procedure: Bypass graft study;  Surgeon: Kuldeep Moore MD;  Location: [facility] CATH/EP LAB;  Service: Cardiology;;    HYSTERECTOMY            Current Outpatient Medications   Medication Instructions    albuterol (PROVENTIL/VENTOLIN HFA) 90 mcg/actuation inhaler SMARTSI Puff(s) Via Inhaler 3 Times Daily PRN    aspirin (ECOTRIN) 325 MG EC tablet 1 tablet, Oral, Daily    cetirizine (ZYRTEC) 10 MG tablet 1 tablet, Oral, Daily    fluticasone propionate (FLONASE) 50 mcg, Each Nostril, Daily    levothyroxine (SYNTHROID) 50 MCG tablet TAKE ONE TABLET BY MOUTH ONCE A DAY    losartan (COZAAR) 50 MG tablet TAKE ONE TABLET BY MOUTH ONCE A DAY        Review of patient's allergies indicates:   Allergen Reactions    Codeine Other (See Comments)     Hypotension       Nalbuphine Other (See Comments)     Hypotension    Atorvastatin      Other reaction(s): intolerance; couldn't sleep; elevated liver enzymes    Ezetimibe      Other reaction(s): intolerance    Pravastatin      Other reaction(s): elevated liver enzymes    Ramipril      Other reaction(s): rash    Rosuvastatin      Other reaction(s): intolerance- elevated liver enzymes    Simvastatin      Other reaction(s): rash        Review of Systems   HENT:  Positive for congestion.    Cardiovascular:  Positive for chest pain. Negative for dyspnea on exertion, leg swelling and palpitations.   Respiratory:  Negative for cough and shortness of breath.    Allergic/Immunologic: Positive for environmental allergies.      Objective:     Vitals:    10/11/22 0804   BP: 122/80   BP Location: Left arm   Patient Position: Sitting   BP Method: Medium (Manual)   Pulse: 63   SpO2: 96%   Weight: 87.5 kg (193 lb)   Height: 5' (1.524 m)       Physical Exam  Vitals and nursing note reviewed.   Constitutional:       Appearance: She is well-developed. She is obese.   HENT:      Head: Normocephalic and atraumatic.   Eyes:      Conjunctiva/sclera: Conjunctivae normal.   Cardiovascular:      Rate and Rhythm: Normal rate and regular rhythm.      Heart sounds: Normal heart sounds.   Pulmonary:      Effort: Pulmonary effort is normal.      Breath sounds: Normal breath sounds.   Abdominal:      General: Bowel sounds are normal.      Palpations: Abdomen is soft.   Musculoskeletal:         General: Normal range of motion.   Skin:     General: Skin is warm and dry.   Neurological:      Mental Status: She is alert and oriented to person, place, and time.   Psychiatric:         Behavior: Behavior normal.         Thought Content: Thought content normal.         Judgment: Judgment normal.     CMP  Sodium   Date Value Ref Range Status   10/06/2022 140 136 - 145 mmol/L Final   05/06/2019 137 134 - 144 mmol/L      Potassium   Date Value Ref Range Status   10/06/2022  4.9 3.5 - 5.1 mmol/L Final     Chloride   Date Value Ref Range Status   10/06/2022 105 95 - 110 mmol/L Final   05/06/2019 102 98 - 110 mmol/L      CO2   Date Value Ref Range Status   10/06/2022 27 23 - 29 mmol/L Final     Glucose   Date Value Ref Range Status   10/06/2022 108 70 - 110 mg/dL Final   05/06/2019 113 (H) 70 - 99 mg/dL      BUN   Date Value Ref Range Status   10/06/2022 14 8 - 23 mg/dL Final     Creatinine   Date Value Ref Range Status   10/06/2022 1.0 0.5 - 1.4 mg/dL Final   05/06/2019 1.20 0.60 - 1.40 mg/dL      Calcium   Date Value Ref Range Status   10/06/2022 9.9 8.7 - 10.5 mg/dL Final     Total Protein   Date Value Ref Range Status   10/06/2022 7.8 6.0 - 8.4 g/dL Final     Albumin   Date Value Ref Range Status   10/06/2022 3.7 3.5 - 5.2 g/dL Final   05/06/2019 3.7 3.1 - 4.7 g/dL      Total Bilirubin   Date Value Ref Range Status   10/06/2022 0.6 0.1 - 1.0 mg/dL Final     Comment:     For infants and newborns, interpretation of results should be based  on gestational age, weight and in agreement with clinical  observations.    Premature Infant recommended reference ranges:  Up to 24 hours.............<8.0 mg/dL  Up to 48 hours............<12.0 mg/dL  3-5 days..................<15.0 mg/dL  6-29 days.................<15.0 mg/dL       Alkaline Phosphatase   Date Value Ref Range Status   10/06/2022 185 (H) 55 - 135 U/L Final     AST   Date Value Ref Range Status   10/06/2022 83 (H) 10 - 40 U/L Final     ALT   Date Value Ref Range Status   10/06/2022 90 (H) 10 - 44 U/L Final     Anion Gap   Date Value Ref Range Status   10/06/2022 8 8 - 16 mmol/L Final     eGFR if    Date Value Ref Range Status   09/21/2021 58.4 (A) >60 mL/min/1.73 m^2 Final     eGFR if non    Date Value Ref Range Status   09/21/2021 50.6 (A) >60 mL/min/1.73 m^2 Final     Comment:     Calculation used to obtain the estimated glomerular filtration  rate (eGFR) is the CKD-EPI equation.         BMP  Lab  Results   Component Value Date     10/06/2022    K 4.9 10/06/2022     10/06/2022    CO2 27 10/06/2022    BUN 14 10/06/2022    CREATININE 1.0 10/06/2022    CALCIUM 9.9 10/06/2022    ANIONGAP 8 10/06/2022    ESTGFRAFRICA 58.4 (A) 09/21/2021    EGFRNONAA 50.6 (A) 09/21/2021      BNP  @LABRCNTIP(BNP,BNPTRIAGEBLO)@   Lab Results   Component Value Date    CHOL 252 (H) 10/06/2022    CHOL 218 (H) 09/21/2021    CHOL 221 (H) 06/24/2020     Lab Results   Component Value Date    HDL 61 10/06/2022    HDL 54 09/21/2021    HDL 57 06/24/2020     Lab Results   Component Value Date    LDLCALC 166.6 (H) 10/06/2022    LDLCALC 142.8 09/21/2021    LDLCALC 139.8 06/24/2020     Lab Results   Component Value Date    TRIG 122 10/06/2022    TRIG 106 09/21/2021    TRIG 121 06/24/2020     Lab Results   Component Value Date    CHOLHDL 24.2 10/06/2022    CHOLHDL 24.8 09/21/2021    CHOLHDL 25.8 06/24/2020      Lab Results   Component Value Date    TSH 2.500 10/06/2022    FREET4 0.62 05/06/2019     Lab Results   Component Value Date    HGBA1C 6.0 10/12/2020     Lab Results   Component Value Date    WBC 7.74 10/06/2022    HGB 15.1 10/06/2022    HCT 47.7 10/06/2022    MCV 96 10/06/2022     10/06/2022         No results found for this or any previous visit.     Results for orders placed during the hospital encounter of 01/02/20    Cardiac catheterization    Conclusion  · Ost RPDA lesion , 99% stenosed.  · Ost 1st Diag lesion , 80% stenosed.  · Ost 2nd Mrg to 2nd Mrg lesion , 100% stenosed.  · Prox LAD lesion , 50% stenosed.  · Estimated blood loss: <50 mL  · Three vessel coronary artery disease.  · Patent saphenous vein graft to the 1st diagonal, obtuse marginal branch, PDA.  · Patent lima to the distal left anterior descending artery    I certify that I was present for catheter insertion, catheter manipulation, angiography, and angiographic interpretation of this patient.    Procedure Log documented by Documenter: Brigid Russell,  RT and verified by Kuldeep Moore MD.    Date: 1/7/2020  Time: 10:02 AM         Assessment:       History of coronary artery bypass surgery- 2012 - 4 vessels - no heart attack  No symptoms of angina    Bradycardia, sinus  Asymptomatic    Hyperlipidemia  Not controlled, she is a statin intolerance unfortunately she cannot afford the Repatha.  This was discussed with her in detail.  Will consider Praluent and see if she can get a grand through them.  She is to diet and I have encouraged her to see if she can get the 90 dollars a month for the Repatha    Statin intolerance  Unfortunately statin cannot be used for her cholesterol.       Plan:       Diet and exercise.  Continue the current medical therapy.  Consider Praluent

## 2022-10-11 NOTE — ASSESSMENT & PLAN NOTE
Not controlled, she is a statin intolerance unfortunately she cannot afford the Repatha.  This was discussed with her in detail.  Will consider Praluent and see if she can get a grand through them.  She is to diet and I have encouraged her to see if she can get the 90 dollars a month for the Repatha

## 2023-02-14 ENCOUNTER — TELEPHONE (OUTPATIENT)
Dept: CARDIOLOGY | Facility: CLINIC | Age: 73
End: 2023-02-14
Payer: MEDICARE

## 2023-02-14 ENCOUNTER — PATIENT MESSAGE (OUTPATIENT)
Dept: CARDIOLOGY | Facility: CLINIC | Age: 73
End: 2023-02-14
Payer: MEDICARE

## 2023-02-14 NOTE — TELEPHONE ENCOUNTER
----- Message from Dorina Zuluaga sent at 2/14/2023  7:58 AM CST -----  Contact: [phone] Patient  Pt is requesting to schedule her  follow up and labs 4/10/2023 - 4/14/2023. Please reach out thru the pt portal.

## 2023-04-11 ENCOUNTER — OFFICE VISIT (OUTPATIENT)
Dept: CARDIOLOGY | Facility: CLINIC | Age: 73
End: 2023-04-11
Payer: MEDICARE

## 2023-04-11 VITALS
SYSTOLIC BLOOD PRESSURE: 122 MMHG | HEIGHT: 60 IN | BODY MASS INDEX: 33.96 KG/M2 | HEART RATE: 59 BPM | WEIGHT: 173 LBS | DIASTOLIC BLOOD PRESSURE: 62 MMHG | OXYGEN SATURATION: 96 %

## 2023-04-11 DIAGNOSIS — I10 PRIMARY HYPERTENSION: ICD-10-CM

## 2023-04-11 DIAGNOSIS — E11.9 TYPE 2 DIABETES MELLITUS WITHOUT COMPLICATION, WITHOUT LONG-TERM CURRENT USE OF INSULIN: ICD-10-CM

## 2023-04-11 DIAGNOSIS — R79.89 ABNORMAL LIVER FUNCTION TESTS: ICD-10-CM

## 2023-04-11 DIAGNOSIS — I25.10 ATHEROSCLEROSIS OF NATIVE CORONARY ARTERY OF NATIVE HEART WITHOUT ANGINA PECTORIS: ICD-10-CM

## 2023-04-11 DIAGNOSIS — E78.2 MIXED HYPERLIPIDEMIA: ICD-10-CM

## 2023-04-11 PROCEDURE — 1160F RVW MEDS BY RX/DR IN RCRD: CPT | Mod: CPTII,S$GLB,, | Performed by: INTERNAL MEDICINE

## 2023-04-11 PROCEDURE — 3074F PR MOST RECENT SYSTOLIC BLOOD PRESSURE < 130 MM HG: ICD-10-PCS | Mod: CPTII,S$GLB,, | Performed by: INTERNAL MEDICINE

## 2023-04-11 PROCEDURE — 99213 PR OFFICE/OUTPT VISIT, EST, LEVL III, 20-29 MIN: ICD-10-PCS | Mod: S$GLB,,, | Performed by: INTERNAL MEDICINE

## 2023-04-11 PROCEDURE — 1159F MED LIST DOCD IN RCRD: CPT | Mod: CPTII,S$GLB,, | Performed by: INTERNAL MEDICINE

## 2023-04-11 PROCEDURE — 1126F PR PAIN SEVERITY QUANTIFIED, NO PAIN PRESENT: ICD-10-PCS | Mod: CPTII,S$GLB,, | Performed by: INTERNAL MEDICINE

## 2023-04-11 PROCEDURE — 99213 OFFICE O/P EST LOW 20 MIN: CPT | Mod: S$GLB,,, | Performed by: INTERNAL MEDICINE

## 2023-04-11 PROCEDURE — 3008F PR BODY MASS INDEX (BMI) DOCUMENTED: ICD-10-PCS | Mod: CPTII,S$GLB,, | Performed by: INTERNAL MEDICINE

## 2023-04-11 PROCEDURE — 4010F PR ACE/ARB THEARPY RXD/TAKEN: ICD-10-PCS | Mod: CPTII,S$GLB,, | Performed by: INTERNAL MEDICINE

## 2023-04-11 PROCEDURE — 3288F PR FALLS RISK ASSESSMENT DOCUMENTED: ICD-10-PCS | Mod: CPTII,S$GLB,, | Performed by: INTERNAL MEDICINE

## 2023-04-11 PROCEDURE — 1101F PR PT FALLS ASSESS DOC 0-1 FALLS W/OUT INJ PAST YR: ICD-10-PCS | Mod: CPTII,S$GLB,, | Performed by: INTERNAL MEDICINE

## 2023-04-11 PROCEDURE — 4010F ACE/ARB THERAPY RXD/TAKEN: CPT | Mod: CPTII,S$GLB,, | Performed by: INTERNAL MEDICINE

## 2023-04-11 PROCEDURE — 3288F FALL RISK ASSESSMENT DOCD: CPT | Mod: CPTII,S$GLB,, | Performed by: INTERNAL MEDICINE

## 2023-04-11 PROCEDURE — 1160F PR REVIEW ALL MEDS BY PRESCRIBER/CLIN PHARMACIST DOCUMENTED: ICD-10-PCS | Mod: CPTII,S$GLB,, | Performed by: INTERNAL MEDICINE

## 2023-04-11 PROCEDURE — 3008F BODY MASS INDEX DOCD: CPT | Mod: CPTII,S$GLB,, | Performed by: INTERNAL MEDICINE

## 2023-04-11 PROCEDURE — 1159F PR MEDICATION LIST DOCUMENTED IN MEDICAL RECORD: ICD-10-PCS | Mod: CPTII,S$GLB,, | Performed by: INTERNAL MEDICINE

## 2023-04-11 PROCEDURE — 3078F PR MOST RECENT DIASTOLIC BLOOD PRESSURE < 80 MM HG: ICD-10-PCS | Mod: CPTII,S$GLB,, | Performed by: INTERNAL MEDICINE

## 2023-04-11 PROCEDURE — 1126F AMNT PAIN NOTED NONE PRSNT: CPT | Mod: CPTII,S$GLB,, | Performed by: INTERNAL MEDICINE

## 2023-04-11 PROCEDURE — 1101F PT FALLS ASSESS-DOCD LE1/YR: CPT | Mod: CPTII,S$GLB,, | Performed by: INTERNAL MEDICINE

## 2023-04-11 PROCEDURE — 3078F DIAST BP <80 MM HG: CPT | Mod: CPTII,S$GLB,, | Performed by: INTERNAL MEDICINE

## 2023-04-11 PROCEDURE — 3074F SYST BP LT 130 MM HG: CPT | Mod: CPTII,S$GLB,, | Performed by: INTERNAL MEDICINE

## 2023-04-11 NOTE — PROGRESS NOTES
Patient ID:  Elena Wells is a 72 y.o. female who presents for follow-up of Coronary Artery Disease, Hypertension, and Hyperlipidemia      She has been dieting and has lost 20 lb.  She feels much better.  Her blood sugar has been better controlled.  Her liver enzymes have come down.      Past Medical History:   Diagnosis Date    Abnormal stress test     Allergy     CAD (coronary artery disease)     Cataract     Fatty liver     Gall stones     Hypercholesteremia     Hypertension     Hypothyroidism     Sleep apnea         Past Surgical History:   Procedure Laterality Date    APPENDECTOMY      CARDIAC CATHETERIZATION      CATARACT EXTRACTION, BILATERAL      CORONARY ANGIOGRAPHY Left 2020    Procedure: ANGIOGRAM, CORONARY ARTERY;  Surgeon: Kuldeep Moore MD;  Location: [facility] CATH/EP LAB;  Service: Cardiology;  Laterality: Left;    CORONARY ARTERY BYPASS GRAFT      [id number]    CORONARY BYPASS GRAFT ANGIOGRAPHY  2020    Procedure: Bypass graft study;  Surgeon: Kuldeep Moore MD;  Location: [facility] CATH/EP LAB;  Service: Cardiology;;    HYSTERECTOMY            Current Outpatient Medications   Medication Instructions    albuterol (PROVENTIL/VENTOLIN HFA) 90 mcg/actuation inhaler SMARTSI Puff(s) Via Inhaler 3 Times Daily PRN    aspirin (ECOTRIN) 325 MG EC tablet 1 tablet, Oral, Daily    cetirizine (ZYRTEC) 10 MG tablet 1 tablet, Oral, Daily    fluticasone propionate (FLONASE) 50 mcg, Each Nostril, Daily    levothyroxine (SYNTHROID) 50 MCG tablet TAKE ONE TABLET BY MOUTH ONCE A DAY    losartan (COZAAR) 50 MG tablet TAKE ONE TABLET BY MOUTH ONCE A DAY        Review of patient's allergies indicates:   Allergen Reactions    Codeine Other (See Comments)     Hypotension      Nalbuphine Other (See Comments)     Hypotension    Ezetimibe      Other reaction(s): intolerance    Pravastatin      Other reaction(s): elevated liver enzymes    Ramipril      Other reaction(s): rash    Rosuvastatin       Other reaction(s): intolerance- elevated liver enzymes  Other reaction(s): increase liver enzymes    Simvastatin      Other reaction(s): rash  Other reaction(s): Unknown    Atorvastatin Rash     Other reaction(s): intolerance; couldn't sleep; elevated liver enzymes        Review of Systems   Cardiovascular:  Negative for chest pain, dyspnea on exertion and palpitations.   Respiratory:  Negative for cough and shortness of breath.    Gastrointestinal:  Negative for bloating and heartburn.      Objective:     Vitals:    04/11/23 1437   BP: 122/62   BP Location: Left arm   Patient Position: Sitting   BP Method: Medium (Manual)   Pulse: (!) 59   SpO2: 96%   Weight: 78.5 kg (173 lb)   Height: 5' (1.524 m)       Physical Exam  Vitals and nursing note reviewed.   Constitutional:       Appearance: She is well-developed.   HENT:      Head: Normocephalic and atraumatic.   Eyes:      Conjunctiva/sclera: Conjunctivae normal.   Cardiovascular:      Rate and Rhythm: Normal rate and regular rhythm.      Heart sounds: Normal heart sounds.   Pulmonary:      Effort: Pulmonary effort is normal.      Breath sounds: Normal breath sounds.   Abdominal:      General: Bowel sounds are normal.      Palpations: Abdomen is soft.   Musculoskeletal:         General: Normal range of motion.   Skin:     General: Skin is warm and dry.   Neurological:      Mental Status: She is alert and oriented to person, place, and time.   Psychiatric:         Behavior: Behavior normal.         Thought Content: Thought content normal.         Judgment: Judgment normal.     CMP  Sodium   Date Value Ref Range Status   10/06/2022 140 136 - 145 mmol/L Final   05/06/2019 137 134 - 144 mmol/L      Potassium   Date Value Ref Range Status   10/06/2022 4.9 3.5 - 5.1 mmol/L Final     Chloride   Date Value Ref Range Status   10/06/2022 105 95 - 110 mmol/L Final   05/06/2019 102 98 - 110 mmol/L      CO2   Date Value Ref Range Status   10/06/2022 27 23 - 29 mmol/L Final      Glucose   Date Value Ref Range Status   10/06/2022 108 70 - 110 mg/dL Final   05/06/2019 113 (H) 70 - 99 mg/dL      BUN   Date Value Ref Range Status   10/06/2022 14 8 - 23 mg/dL Final     Creatinine   Date Value Ref Range Status   10/06/2022 1.0 0.5 - 1.4 mg/dL Final   05/06/2019 1.20 0.60 - 1.40 mg/dL      Calcium   Date Value Ref Range Status   10/06/2022 9.9 8.7 - 10.5 mg/dL Final     Total Protein   Date Value Ref Range Status   10/06/2022 7.8 6.0 - 8.4 g/dL Final     Albumin   Date Value Ref Range Status   10/06/2022 3.7 3.5 - 5.2 g/dL Final   05/06/2019 3.7 3.1 - 4.7 g/dL      Total Bilirubin   Date Value Ref Range Status   10/06/2022 0.6 0.1 - 1.0 mg/dL Final     Comment:     For infants and newborns, interpretation of results should be based  on gestational age, weight and in agreement with clinical  observations.    Premature Infant recommended reference ranges:  Up to 24 hours.............<8.0 mg/dL  Up to 48 hours............<12.0 mg/dL  3-5 days..................<15.0 mg/dL  6-29 days.................<15.0 mg/dL       Alkaline Phosphatase   Date Value Ref Range Status   10/06/2022 185 (H) 55 - 135 U/L Final     AST   Date Value Ref Range Status   10/06/2022 83 (H) 10 - 40 U/L Final     ALT   Date Value Ref Range Status   10/06/2022 90 (H) 10 - 44 U/L Final     Anion Gap   Date Value Ref Range Status   10/06/2022 8 8 - 16 mmol/L Final     eGFR if    Date Value Ref Range Status   09/21/2021 58.4 (A) >60 mL/min/1.73 m^2 Final     eGFR if non    Date Value Ref Range Status   09/21/2021 50.6 (A) >60 mL/min/1.73 m^2 Final     Comment:     Calculation used to obtain the estimated glomerular filtration  rate (eGFR) is the CKD-EPI equation.         BMP  Lab Results   Component Value Date     10/06/2022    K 4.9 10/06/2022     10/06/2022    CO2 27 10/06/2022    BUN 14 10/06/2022    CREATININE 1.0 10/06/2022    CALCIUM 9.9 10/06/2022    ANIONGAP 8 10/06/2022     ESTGFRAFRICA 58.4 (A) 09/21/2021    EGFRNONAA 50.6 (A) 09/21/2021      BNP  @LABRCNTIP(BNP,BNPTRIAGEBLO)@   Lab Results   Component Value Date    CHOL 252 (H) 10/06/2022    CHOL 218 (H) 09/21/2021    CHOL 221 (H) 06/24/2020     Lab Results   Component Value Date    HDL 61 10/06/2022    HDL 54 09/21/2021    HDL 57 06/24/2020     Lab Results   Component Value Date    LDLCALC 166.6 (H) 10/06/2022    LDLCALC 142.8 09/21/2021    LDLCALC 139.8 06/24/2020     Lab Results   Component Value Date    TRIG 122 10/06/2022    TRIG 106 09/21/2021    TRIG 121 06/24/2020     Lab Results   Component Value Date    CHOLHDL 24.2 10/06/2022    CHOLHDL 24.8 09/21/2021    CHOLHDL 25.8 06/24/2020      Lab Results   Component Value Date    TSH 2.500 10/06/2022    FREET4 0.62 05/06/2019     Lab Results   Component Value Date    HGBA1C 6.0 10/12/2020     Lab Results   Component Value Date    WBC 7.74 10/06/2022    HGB 15.1 10/06/2022    HCT 47.7 10/06/2022    MCV 96 10/06/2022     10/06/2022         No results found for this or any previous visit.     Results for orders placed during the hospital encounter of 01/02/20    Cardiac catheterization    Conclusion  · Ost RPDA lesion , 99% stenosed.  · Ost 1st Diag lesion , 80% stenosed.  · Ost 2nd Mrg to 2nd Mrg lesion , 100% stenosed.  · Prox LAD lesion , 50% stenosed.  · Estimated blood loss: <50 mL  · Three vessel coronary artery disease.  · Patent saphenous vein graft to the 1st diagonal, obtuse marginal branch, PDA.  · Patent lima to the distal left anterior descending artery    I certify that I was present for catheter insertion, catheter manipulation, angiography, and angiographic interpretation of this patient.    Procedure Log documented by Documenter: RT Minnie and verified by Kuldeep Moore MD.    Date: 1/7/2020  Time: 10:02 AM         Assessment:       Atherosclerosis of native coronary artery of native heart without angina pectoris  Stable no symptoms of angina  status post coronary artery bypass    Hyperlipidemia  Not at goal although she has elevated liver function test has not been on a statin    Hypertension  Controlled on 50 mg of losartan    Diabetes mellitus, type 2  Aware    Abnormal liver function tests  Improved       Plan:     Continue her diet and exercise.  She will be seen in 6 months.  Continue to follow her liver enzymes closely

## 2023-05-01 DIAGNOSIS — K76.0 HEPATIC STEATOSIS: Primary | ICD-10-CM

## 2023-05-12 ENCOUNTER — HOSPITAL ENCOUNTER (OUTPATIENT)
Dept: RADIOLOGY | Facility: HOSPITAL | Age: 73
Discharge: HOME | End: 2023-05-12
Attending: INTERNAL MEDICINE
Payer: MEDICARE

## 2023-05-12 DIAGNOSIS — K76.0 HEPATIC STEATOSIS: ICD-10-CM

## 2023-05-12 PROCEDURE — 76700 US EXAM ABDOM COMPLETE: CPT | Mod: TC,PO

## 2023-10-04 DIAGNOSIS — N95.9 UNSPECIFIED MENOPAUSAL AND PERIMENOPAUSAL DISORDER: ICD-10-CM

## 2023-10-04 DIAGNOSIS — Z12.31 ENCOUNTER FOR SCREENING MAMMOGRAM FOR MALIGNANT NEOPLASM OF BREAST: Primary | ICD-10-CM

## 2023-10-10 ENCOUNTER — HOSPITAL ENCOUNTER (OUTPATIENT)
Dept: RADIOLOGY | Facility: HOSPITAL | Age: 73
Discharge: HOME | End: 2023-10-10
Attending: INTERNAL MEDICINE
Payer: MEDICARE

## 2023-10-10 VITALS — HEIGHT: 60 IN | BODY MASS INDEX: 33.98 KG/M2 | WEIGHT: 173.06 LBS

## 2023-10-10 DIAGNOSIS — N95.9 UNSPECIFIED MENOPAUSAL AND PERIMENOPAUSAL DISORDER: ICD-10-CM

## 2023-10-10 DIAGNOSIS — Z12.31 ENCOUNTER FOR SCREENING MAMMOGRAM FOR MALIGNANT NEOPLASM OF BREAST: ICD-10-CM

## 2023-10-10 PROCEDURE — 77080 DXA BONE DENSITY AXIAL: CPT | Mod: TC,PO

## 2023-10-10 PROCEDURE — 77067 SCR MAMMO BI INCL CAD: CPT | Mod: TC,PO

## 2024-02-09 ENCOUNTER — OFFICE VISIT (OUTPATIENT)
Dept: CARDIOLOGY | Facility: CLINIC | Age: 74
End: 2024-02-09
Payer: MEDICARE

## 2024-02-09 VITALS
HEART RATE: 52 BPM | HEIGHT: 60 IN | SYSTOLIC BLOOD PRESSURE: 122 MMHG | OXYGEN SATURATION: 97 % | DIASTOLIC BLOOD PRESSURE: 62 MMHG | BODY MASS INDEX: 31.62 KG/M2 | WEIGHT: 161.06 LBS

## 2024-02-09 DIAGNOSIS — E78.2 MIXED HYPERLIPIDEMIA: ICD-10-CM

## 2024-02-09 DIAGNOSIS — I25.10 ATHEROSCLEROSIS OF NATIVE CORONARY ARTERY OF NATIVE HEART WITHOUT ANGINA PECTORIS: Primary | ICD-10-CM

## 2024-02-09 DIAGNOSIS — I10 PRIMARY HYPERTENSION: ICD-10-CM

## 2024-02-09 DIAGNOSIS — Z78.9 STATIN INTOLERANCE: ICD-10-CM

## 2024-02-09 DIAGNOSIS — R00.1 BRADYCARDIA, SINUS: ICD-10-CM

## 2024-02-09 DIAGNOSIS — E03.9 ACQUIRED HYPOTHYROIDISM: ICD-10-CM

## 2024-02-09 PROCEDURE — 1159F MED LIST DOCD IN RCRD: CPT | Mod: CPTII,S$GLB,, | Performed by: INTERNAL MEDICINE

## 2024-02-09 PROCEDURE — 1126F AMNT PAIN NOTED NONE PRSNT: CPT | Mod: CPTII,S$GLB,, | Performed by: INTERNAL MEDICINE

## 2024-02-09 PROCEDURE — 99213 OFFICE O/P EST LOW 20 MIN: CPT | Mod: 25,S$GLB,, | Performed by: INTERNAL MEDICINE

## 2024-02-09 PROCEDURE — 3078F DIAST BP <80 MM HG: CPT | Mod: CPTII,S$GLB,, | Performed by: INTERNAL MEDICINE

## 2024-02-09 PROCEDURE — 1160F RVW MEDS BY RX/DR IN RCRD: CPT | Mod: CPTII,S$GLB,, | Performed by: INTERNAL MEDICINE

## 2024-02-09 PROCEDURE — 3008F BODY MASS INDEX DOCD: CPT | Mod: CPTII,S$GLB,, | Performed by: INTERNAL MEDICINE

## 2024-02-09 PROCEDURE — 93000 ELECTROCARDIOGRAM COMPLETE: CPT | Mod: S$GLB,,, | Performed by: INTERNAL MEDICINE

## 2024-02-09 PROCEDURE — 3074F SYST BP LT 130 MM HG: CPT | Mod: CPTII,S$GLB,, | Performed by: INTERNAL MEDICINE

## 2024-02-09 PROCEDURE — 99999 PR PBB SHADOW E&M-EST. PATIENT-LVL IV: CPT | Mod: PBBFAC,,, | Performed by: INTERNAL MEDICINE

## 2024-02-09 PROCEDURE — 1101F PT FALLS ASSESS-DOCD LE1/YR: CPT | Mod: CPTII,S$GLB,, | Performed by: INTERNAL MEDICINE

## 2024-02-09 PROCEDURE — 3288F FALL RISK ASSESSMENT DOCD: CPT | Mod: CPTII,S$GLB,, | Performed by: INTERNAL MEDICINE

## 2024-02-09 RX ORDER — EVOLOCUMAB 140 MG/ML
140 INJECTION, SOLUTION SUBCUTANEOUS
Qty: 2 EACH | Refills: 11 | Status: ACTIVE | OUTPATIENT
Start: 2024-02-09

## 2024-02-09 RX ORDER — EZETIMIBE 10 MG/1
10 TABLET ORAL
COMMUNITY

## 2024-02-09 NOTE — PROGRESS NOTES
Patient ID:  Elena Wells is a 73 y.o. female who presents for follow-up of Coronary Artery Disease and Hyperlipidemia      History of coronary artery bypass surgery- 2012 - 4 vessels - no heart attack  No symptoms of angina     Bradycardia, sinus  Asymptomatic     Hyperlipidemia  Not controlled, she is a statin intolerance unfortunately she cannot afford the Repatha.  This was discussed with her in detail.  Will consider Praluent and see if she can get a grand through them.  She is to diet and I have encouraged her to see if she can get the 90 dollars a month for the Repatha     Statin intolerance  Unfortunately statin cannot be used for her cholesterol.    She has been feeling great.  She is going to the [facility] twice a week a gout comes over and they do cardio and some weights.  He denies any symptoms whatsoever.  She is seen her primary care physician and is going to have her annual labs sometime towards the end of this month.  She is willing to try Repatha again to see if the price is lower.  She was able to tolerate the medicine.        Past Medical History:   Diagnosis Date    Abnormal stress test     Allergy     CAD (coronary artery disease)     Cataract     Fatty liver     Gall stones     Hypercholesteremia     Hypertension     Hypothyroidism     Sleep apnea         Past Surgical History:   Procedure Laterality Date    APPENDECTOMY      CARDIAC CATHETERIZATION      CATARACT EXTRACTION, BILATERAL      CORONARY ANGIOGRAPHY Left 1/2/2020    Procedure: ANGIOGRAM, CORONARY ARTERY;  Surgeon: Kuldeep Moore MD;  Location: [facility] CATH/EP LAB;  Service: Cardiology;  Laterality: Left;    CORONARY ARTERY BYPASS GRAFT  2012    [id number]    CORONARY BYPASS GRAFT ANGIOGRAPHY  1/2/2020    Procedure: Bypass graft study;  Surgeon: Kuldeep Moore MD;  Location: [facility] CATH/EP LAB;  Service: Cardiology;;    HYSTERECTOMY            Current Outpatient Medications   Medication Instructions    albuterol  (PROVENTIL/VENTOLIN HFA) 90 mcg/actuation inhaler SMARTSI Puff(s) Via Inhaler 3 Times Daily PRN    aspirin (ECOTRIN) 325 MG EC tablet 1 tablet, Oral, Daily    cetirizine (ZYRTEC) 10 MG tablet 1 tablet, Oral, Daily    ezetimibe (ZETIA) 10 mg, Oral    fluticasone propionate (FLONASE) 50 mcg, Each Nostril, Daily    levothyroxine (SYNTHROID) 50 MCG tablet TAKE ONE TABLET BY MOUTH ONCE A DAY    losartan (COZAAR) 50 MG tablet TAKE ONE TABLET BY MOUTH ONCE A DAY    REPATHA SURECLICK 140 mg, Subcutaneous, Every 14 days        Review of patient's allergies indicates:   Allergen Reactions    Codeine Other (See Comments)     Hypotension      Nalbuphine Other (See Comments)     Hypotension    Pravastatin      Other reaction(s): elevated liver enzymes    Ramipril      Other reaction(s): rash    Rosuvastatin      Other reaction(s): intolerance- elevated liver enzymes  Other reaction(s): increase liver enzymes    Simvastatin      Other reaction(s): rash  Other reaction(s): Unknown    Atorvastatin Rash     Other reaction(s): intolerance; couldn't sleep; elevated liver enzymes        Review of Systems   Cardiovascular:  Negative for chest pain, dyspnea on exertion and palpitations.   Respiratory:  Negative for cough and shortness of breath.         Objective:     Vitals:    24 0832   BP: 122/62   BP Location: Left arm   Patient Position: Sitting   BP Method: Medium (Manual)   Pulse: (!) 52   SpO2: 97%   Weight: 73.1 kg (161 lb 0.7 oz)   Height: 5' (1.524 m)       Physical Exam  Vitals and nursing note reviewed.   Constitutional:       Appearance: She is well-developed.   HENT:      Head: Normocephalic and atraumatic.   Eyes:      Conjunctiva/sclera: Conjunctivae normal.   Cardiovascular:      Rate and Rhythm: Normal rate and regular rhythm.      Heart sounds: Normal heart sounds.   Pulmonary:      Effort: Pulmonary effort is normal.      Breath sounds: Normal breath sounds.   Abdominal:      General: Bowel sounds are  normal.      Palpations: Abdomen is soft.   Musculoskeletal:         General: Normal range of motion.   Skin:     General: Skin is warm and dry.   Neurological:      Mental Status: She is alert and oriented to person, place, and time.   Psychiatric:         Behavior: Behavior normal.         Thought Content: Thought content normal.         Judgment: Judgment normal.       CMP  Sodium   Date Value Ref Range Status   10/06/2022 140 136 - 145 mmol/L Final   05/06/2019 137 134 - 144 mmol/L      Potassium   Date Value Ref Range Status   10/06/2022 4.9 3.5 - 5.1 mmol/L Final     Chloride   Date Value Ref Range Status   10/06/2022 105 95 - 110 mmol/L Final   05/06/2019 102 98 - 110 mmol/L      CO2   Date Value Ref Range Status   10/06/2022 27 23 - 29 mmol/L Final     Glucose   Date Value Ref Range Status   10/06/2022 108 70 - 110 mg/dL Final   05/06/2019 113 (H) 70 - 99 mg/dL      BUN   Date Value Ref Range Status   10/06/2022 14 8 - 23 mg/dL Final     Creatinine   Date Value Ref Range Status   10/06/2022 1.0 0.5 - 1.4 mg/dL Final   05/06/2019 1.20 0.60 - 1.40 mg/dL      Calcium   Date Value Ref Range Status   10/06/2022 9.9 8.7 - 10.5 mg/dL Final     Total Protein   Date Value Ref Range Status   10/06/2022 7.8 6.0 - 8.4 g/dL Final     Albumin   Date Value Ref Range Status   10/06/2022 3.7 3.5 - 5.2 g/dL Final   05/06/2019 3.7 3.1 - 4.7 g/dL      Total Bilirubin   Date Value Ref Range Status   10/06/2022 0.6 0.1 - 1.0 mg/dL Final     Comment:     For infants and newborns, interpretation of results should be based  on gestational age, weight and in agreement with clinical  observations.    Premature Infant recommended reference ranges:  Up to 24 hours.............<8.0 mg/dL  Up to 48 hours............<12.0 mg/dL  3-5 days..................<15.0 mg/dL  6-29 days.................<15.0 mg/dL       Alkaline Phosphatase   Date Value Ref Range Status   10/06/2022 185 (H) 55 - 135 U/L Final     AST   Date Value Ref Range Status    10/06/2022 83 (H) 10 - 40 U/L Final     ALT   Date Value Ref Range Status   10/06/2022 90 (H) 10 - 44 U/L Final     Anion Gap   Date Value Ref Range Status   10/06/2022 8 8 - 16 mmol/L Final     eGFR if    Date Value Ref Range Status   09/21/2021 58.4 (A) >60 mL/min/1.73 m^2 Final     eGFR if non    Date Value Ref Range Status   09/21/2021 50.6 (A) >60 mL/min/1.73 m^2 Final     Comment:     Calculation used to obtain the estimated glomerular filtration  rate (eGFR) is the CKD-EPI equation.         BMP  Lab Results   Component Value Date     10/06/2022    K 4.9 10/06/2022     10/06/2022    CO2 27 10/06/2022    BUN 14 10/06/2022    CREATININE 1.0 10/06/2022    CALCIUM 9.9 10/06/2022    ANIONGAP 8 10/06/2022    ESTGFRAFRICA 58.4 (A) 09/21/2021    EGFRNONAA 50.6 (A) 09/21/2021      BNP  @LABRCNTIP(BNP,BNPTRIAGEBLO)@   Lab Results   Component Value Date    CHOL 252 (H) 10/06/2022    CHOL 218 (H) 09/21/2021    CHOL 221 (H) 06/24/2020     Lab Results   Component Value Date    HDL 61 10/06/2022    HDL 54 09/21/2021    HDL 57 06/24/2020     Lab Results   Component Value Date    LDLCALC 166.6 (H) 10/06/2022    LDLCALC 142.8 09/21/2021    LDLCALC 139.8 06/24/2020     Lab Results   Component Value Date    TRIG 122 10/06/2022    TRIG 106 09/21/2021    TRIG 121 06/24/2020     Lab Results   Component Value Date    CHOLHDL 24.2 10/06/2022    CHOLHDL 24.8 09/21/2021    CHOLHDL 25.8 06/24/2020      Lab Results   Component Value Date    TSH 2.500 10/06/2022    FREET4 0.62 05/06/2019     Lab Results   Component Value Date    HGBA1C 6.0 10/12/2020     Lab Results   Component Value Date    WBC 7.74 10/06/2022    HGB 15.1 10/06/2022    HCT 47.7 10/06/2022    MCV 96 10/06/2022     10/06/2022         No results found for this or any previous visit.     Results for orders placed during the hospital encounter of 01/02/20    Cardiac catheterization    Conclusion  · Ost RPDA lesion , 99%  stenosed.  · Ost 1st Diag lesion , 80% stenosed.  · Ost 2nd Mrg to 2nd Mrg lesion , 100% stenosed.  · Prox LAD lesion , 50% stenosed.  · Estimated blood loss: <50 mL  · Three vessel coronary artery disease.  · Patent saphenous vein graft to the 1st diagonal, obtuse marginal branch, PDA.  · Patent lima to the distal left anterior descending artery    I certify that I was present for catheter insertion, catheter manipulation, angiography, and angiographic interpretation of this patient.    Procedure Log documented by Documenter: RT Minnie and verified by Kuldeep Moore MD.    Date: 1/7/2020  Time: 10:02 AM         Assessment:       Atherosclerosis of native coronary artery of native heart without angina pectoris  She denies any symptoms of angina.  She had previous coronary artery bypass.    Bradycardia, sinus  Asymptomatic sinus bradycardia    Hyperlipidemia  Unable to tolerate a statins.  Will attempt Repatha.  Her LDL cholesterol is 166    Hypertension  Controlled on 50 mg of losartan    Acquired hypothyroidism  On thyroid replacement       Plan:       Obtain laboratory data from her primary care physician.  Try Repatha from [facility] and see if it is cheaper for her.  She will be seen in the office in 6 months

## 2024-03-09 LAB
OHS QRS DURATION: 76 MS
OHS QTC CALCULATION: 414 MS

## 2024-10-14 DIAGNOSIS — N95.9 UNSPECIFIED MENOPAUSAL AND PERIMENOPAUSAL DISORDER: Primary | ICD-10-CM

## 2024-10-14 DIAGNOSIS — Z12.31 BREAST CANCER SCREENING BY MAMMOGRAM: Primary | ICD-10-CM

## 2024-10-29 ENCOUNTER — HOSPITAL ENCOUNTER (OUTPATIENT)
Dept: RADIOLOGY | Facility: HOSPITAL | Age: 74
Discharge: HOME | End: 2024-10-29
Attending: NURSE PRACTITIONER
Payer: MEDICARE

## 2024-10-29 VITALS — HEIGHT: 60 IN | WEIGHT: 161 LBS | BODY MASS INDEX: 31.61 KG/M2

## 2024-10-29 DIAGNOSIS — Z12.31 BREAST CANCER SCREENING BY MAMMOGRAM: ICD-10-CM

## 2024-10-29 PROCEDURE — 77067 SCR MAMMO BI INCL CAD: CPT | Mod: TC,PO

## 2024-10-29 PROCEDURE — 77063 BREAST TOMOSYNTHESIS BI: CPT | Mod: TC,PO

## 2024-10-29 PROCEDURE — 77063 BREAST TOMOSYNTHESIS BI: CPT | Mod: 26,,, | Performed by: RADIOLOGY

## 2024-10-29 PROCEDURE — 77067 SCR MAMMO BI INCL CAD: CPT | Mod: 26,,, | Performed by: RADIOLOGY

## 2025-01-28 ENCOUNTER — TELEPHONE (OUTPATIENT)
Dept: CARDIOLOGY | Facility: CLINIC | Age: 75
End: 2025-01-28
Payer: MEDICARE

## 2025-02-06 DIAGNOSIS — Z78.9 STATIN INTOLERANCE: ICD-10-CM

## 2025-02-06 DIAGNOSIS — E78.2 MIXED HYPERLIPIDEMIA: ICD-10-CM

## 2025-02-06 DIAGNOSIS — I25.10 ATHEROSCLEROSIS OF NATIVE CORONARY ARTERY OF NATIVE HEART WITHOUT ANGINA PECTORIS: ICD-10-CM

## 2025-02-06 RX ORDER — EVOLOCUMAB 140 MG/ML
140 INJECTION, SOLUTION SUBCUTANEOUS
Qty: 2 EACH | Refills: 11 | Status: ACTIVE | OUTPATIENT
Start: 2025-02-06

## 2025-02-14 ENCOUNTER — OFFICE VISIT (OUTPATIENT)
Dept: CARDIOLOGY | Facility: CLINIC | Age: 75
End: 2025-02-14
Payer: MEDICARE

## 2025-02-14 VITALS
HEART RATE: 51 BPM | WEIGHT: 165.25 LBS | HEIGHT: 60 IN | BODY MASS INDEX: 32.44 KG/M2 | SYSTOLIC BLOOD PRESSURE: 122 MMHG | DIASTOLIC BLOOD PRESSURE: 62 MMHG | OXYGEN SATURATION: 97 %

## 2025-02-14 DIAGNOSIS — E78.2 MIXED HYPERLIPIDEMIA: Primary | ICD-10-CM

## 2025-02-14 DIAGNOSIS — I25.6 SILENT MYOCARDIAL ISCHEMIA: ICD-10-CM

## 2025-02-14 DIAGNOSIS — I25.10 ATHEROSCLEROSIS OF NATIVE CORONARY ARTERY OF NATIVE HEART WITHOUT ANGINA PECTORIS: ICD-10-CM

## 2025-02-14 DIAGNOSIS — R79.89 ABNORMAL LIVER FUNCTION TESTS: ICD-10-CM

## 2025-02-14 DIAGNOSIS — I10 PRIMARY HYPERTENSION: ICD-10-CM

## 2025-02-14 DIAGNOSIS — R00.1 BRADYCARDIA, SINUS: ICD-10-CM

## 2025-02-14 DIAGNOSIS — Z95.1 HISTORY OF CORONARY ARTERY BYPASS SURGERY: ICD-10-CM

## 2025-02-14 PROCEDURE — 3078F DIAST BP <80 MM HG: CPT | Mod: CPTII,S$GLB,, | Performed by: INTERNAL MEDICINE

## 2025-02-14 PROCEDURE — 3074F SYST BP LT 130 MM HG: CPT | Mod: CPTII,S$GLB,, | Performed by: INTERNAL MEDICINE

## 2025-02-14 PROCEDURE — 1159F MED LIST DOCD IN RCRD: CPT | Mod: CPTII,S$GLB,, | Performed by: INTERNAL MEDICINE

## 2025-02-14 PROCEDURE — 3288F FALL RISK ASSESSMENT DOCD: CPT | Mod: CPTII,S$GLB,, | Performed by: INTERNAL MEDICINE

## 2025-02-14 PROCEDURE — 1126F AMNT PAIN NOTED NONE PRSNT: CPT | Mod: CPTII,S$GLB,, | Performed by: INTERNAL MEDICINE

## 2025-02-14 PROCEDURE — 1160F RVW MEDS BY RX/DR IN RCRD: CPT | Mod: CPTII,S$GLB,, | Performed by: INTERNAL MEDICINE

## 2025-02-14 PROCEDURE — 1101F PT FALLS ASSESS-DOCD LE1/YR: CPT | Mod: CPTII,S$GLB,, | Performed by: INTERNAL MEDICINE

## 2025-02-14 PROCEDURE — 99999 PR PBB SHADOW E&M-EST. PATIENT-LVL III: CPT | Mod: PBBFAC,,, | Performed by: INTERNAL MEDICINE

## 2025-02-14 PROCEDURE — 3008F BODY MASS INDEX DOCD: CPT | Mod: CPTII,S$GLB,, | Performed by: INTERNAL MEDICINE

## 2025-02-14 NOTE — PROGRESS NOTES
Patient ID:  Elena Wells is a 74 y.o. female who presents for follow-up of Coronary Artery Disease, Hypertension, and Hyperlipidemia      History of coronary artery bypass surgery- 2012 - 4 vessels - no heart attack  No symptoms of angina     Bradycardia, sinus  Asymptomatic     Hyperlipidemia  Not controlled, she is a statin intolerance unfortunately she cannot afford the Repatha.  This was discussed with her in detail.  Will consider [employer] and see if she can get a grand through them.  She is to diet and I have encouraged her to see if she can get the 90 dollars a month for the Repatha     Statin intolerance  Unfortunately statin cannot be used for her cholesterol.     02/09/2024  She has been feeling great.  She is going to the [facility] twice a week a gout comes over and they do cardio and some weights.  He denies any symptoms whatsoever.  She is seen her primary care physician and is going to have her annual labs sometime towards the end of this month.  She is willing to try Repatha again to see if the price is lower.  She was able to tolerate the medicine.   02/14/2025  She has been doing well denies any chest pains any shortness of breath.  Prior to her coronary artery bypass the only symptoms she had it was indigestion.  In the past we try ezetimibe on her and her liver function tests got elevated.  Her coronary artery bypass was performed on 04/17/2012 she had a LIMA to the LAD saphenous vein graft to the 1st diagonal, saphenous vein graft to the obtuse marginal branch, saphenous vein graft to the PDA.               Past Medical History:   Diagnosis Date    Abnormal stress test     Allergy     CAD (coronary artery disease)     Cataract     Fatty liver     Gall stones     Hypercholesteremia     Hypertension     Hypothyroidism     Sleep apnea         Past Surgical History:   Procedure Laterality Date    APPENDECTOMY      CARDIAC CATHETERIZATION      CATARACT EXTRACTION, BILATERAL       CORONARY ANGIOGRAPHY Left 2020    Procedure: ANGIOGRAM, CORONARY ARTERY;  Surgeon: Kuldeep Moore MD;  Location: [facility] CATH/EP LAB;  Service: Cardiology;  Laterality: Left;    CORONARY ARTERY BYPASS GRAFT      [id number]    CORONARY BYPASS GRAFT ANGIOGRAPHY  2020    Procedure: Bypass graft study;  Surgeon: Kuldeep Moore MD;  Location: [facility] CATH/EP LAB;  Service: Cardiology;;    HYSTERECTOMY            Current Outpatient Medications   Medication Instructions    albuterol (PROVENTIL/VENTOLIN HFA) 90 mcg/actuation inhaler SMARTSI Puff(s) Via Inhaler 3 Times Daily PRN    aspirin (ECOTRIN) 325 MG EC tablet 1 tablet, Daily    cetirizine (ZYRTEC) 10 MG tablet 1 tablet, Daily    fluticasone propionate (FLONASE) 50 mcg, Each Nostril, Daily    levothyroxine (SYNTHROID) 50 MCG tablet TAKE ONE TABLET BY MOUTH ONCE A DAY    losartan (COZAAR) 50 MG tablet TAKE ONE TABLET BY MOUTH ONCE A DAY    REPATHA SURECLICK 140 mg, Subcutaneous, Every 14 days        Review of patient's allergies indicates:   Allergen Reactions    Codeine Other (See Comments)     Hypotension      Nalbuphine Other (See Comments)     Hypotension    Pravastatin      Other reaction(s): elevated liver enzymes    Ramipril      Other reaction(s): rash    Rosuvastatin      Other reaction(s): intolerance- elevated liver enzymes  Other reaction(s): increase liver enzymes    Simvastatin      Other reaction(s): rash  Other reaction(s): Unknown    Atorvastatin Rash     Other reaction(s): intolerance; couldn't sleep; elevated liver enzymes        Review of Systems   Cardiovascular:  Negative for chest pain, dyspnea on exertion and palpitations.   Respiratory:  Negative for cough and shortness of breath.         Objective:     Vitals:    25 1038   BP: 122/62   BP Location: Left arm   Patient Position: Sitting   Pulse: (!) 51   SpO2: 97%   Weight: 75 kg (165 lb 3.8 oz)   Height: 5' (1.524 m)       Physical Exam  Vitals and nursing note reviewed.    Constitutional:       Appearance: She is well-developed.   HENT:      Head: Normocephalic and atraumatic.   Eyes:      Conjunctiva/sclera: Conjunctivae normal.   Cardiovascular:      Rate and Rhythm: Normal rate and regular rhythm.      Heart sounds: Normal heart sounds.   Pulmonary:      Effort: Pulmonary effort is normal.      Breath sounds: Normal breath sounds.   Abdominal:      General: Bowel sounds are normal.      Palpations: Abdomen is soft.   Musculoskeletal:         General: Normal range of motion.   Skin:     General: Skin is warm and dry.   Neurological:      Mental Status: She is alert and oriented to person, place, and time.   Psychiatric:         Behavior: Behavior normal.         Thought Content: Thought content normal.         Judgment: Judgment normal.       CMP  Sodium   Date Value Ref Range Status   10/06/2022 140 136 - 145 mmol/L Final   05/06/2019 137 134 - 144 mmol/L      Potassium   Date Value Ref Range Status   10/06/2022 4.9 3.5 - 5.1 mmol/L Final     Chloride   Date Value Ref Range Status   10/06/2022 105 95 - 110 mmol/L Final   05/06/2019 102 98 - 110 mmol/L      CO2   Date Value Ref Range Status   10/06/2022 27 23 - 29 mmol/L Final     Glucose   Date Value Ref Range Status   10/06/2022 108 70 - 110 mg/dL Final   05/06/2019 113 (H) 70 - 99 mg/dL      BUN   Date Value Ref Range Status   10/06/2022 14 8 - 23 mg/dL Final     Creatinine   Date Value Ref Range Status   10/06/2022 1.0 0.5 - 1.4 mg/dL Final   05/06/2019 1.20 0.60 - 1.40 mg/dL      Calcium   Date Value Ref Range Status   10/06/2022 9.9 8.7 - 10.5 mg/dL Final     Total Protein   Date Value Ref Range Status   10/06/2022 7.8 6.0 - 8.4 g/dL Final     Albumin   Date Value Ref Range Status   10/06/2022 3.7 3.5 - 5.2 g/dL Final   05/06/2019 3.7 3.1 - 4.7 g/dL      Total Bilirubin   Date Value Ref Range Status   10/06/2022 0.6 0.1 - 1.0 mg/dL Final     Comment:     For infants and newborns, interpretation of results should be based  on  gestational age, weight and in agreement with clinical  observations.    Premature Infant recommended reference ranges:  Up to 24 hours.............<8.0 mg/dL  Up to 48 hours............<12.0 mg/dL  3-5 days..................<15.0 mg/dL  6-29 days.................<15.0 mg/dL       Alkaline Phosphatase   Date Value Ref Range Status   10/06/2022 185 (H) 55 - 135 U/L Final     AST   Date Value Ref Range Status   10/06/2022 83 (H) 10 - 40 U/L Final     ALT   Date Value Ref Range Status   10/06/2022 90 (H) 10 - 44 U/L Final     Anion Gap   Date Value Ref Range Status   10/06/2022 8 8 - 16 mmol/L Final     eGFR if    Date Value Ref Range Status   09/21/2021 58.4 (A) >60 mL/min/1.73 m^2 Final     eGFR if non    Date Value Ref Range Status   09/21/2021 50.6 (A) >60 mL/min/1.73 m^2 Final     Comment:     Calculation used to obtain the estimated glomerular filtration  rate (eGFR) is the CKD-EPI equation.         BMP  Lab Results   Component Value Date     10/06/2022    K 4.9 10/06/2022     10/06/2022    CO2 27 10/06/2022    BUN 14 10/06/2022    CREATININE 1.0 10/06/2022    CALCIUM 9.9 10/06/2022    ANIONGAP 8 10/06/2022    ESTGFRAFRICA 58.4 (A) 09/21/2021    EGFRNONAA 50.6 (A) 09/21/2021      BNP  @LABRCNTIP(BNP,BNPTRIAGEBLO)@   Lab Results   Component Value Date    CHOL 252 (H) 10/06/2022    CHOL 218 (H) 09/21/2021    CHOL 221 (H) 06/24/2020     Lab Results   Component Value Date    HDL 61 10/06/2022    HDL 54 09/21/2021    HDL 57 06/24/2020     Lab Results   Component Value Date    LDLCALC 166.6 (H) 10/06/2022    LDLCALC 142.8 09/21/2021    LDLCALC 139.8 06/24/2020     Lab Results   Component Value Date    TRIG 122 10/06/2022    TRIG 106 09/21/2021    TRIG 121 06/24/2020     Lab Results   Component Value Date    CHOLHDL 24.2 10/06/2022    CHOLHDL 24.8 09/21/2021    CHOLHDL 25.8 06/24/2020      Lab Results   Component Value Date    TSH 2.500 10/06/2022    FREET4 0.62 05/06/2019      Lab Results   Component Value Date    HGBA1C 6.0 10/12/2020     Lab Results   Component Value Date    WBC 7.74 10/06/2022    HGB 15.1 10/06/2022    HCT 47.7 10/06/2022    MCV 96 10/06/2022     10/06/2022         No results found for this or any previous visit.     Results for orders placed during the hospital encounter of 01/02/20    Cardiac catheterization    Conclusion  · Ost RPDA lesion , 99% stenosed.  · Ost 1st Diag lesion , 80% stenosed.  · Ost 2nd Mrg to 2nd Mrg lesion , 100% stenosed.  · Prox LAD lesion , 50% stenosed.  · Estimated blood loss: <50 mL  · Three vessel coronary artery disease.  · Patent saphenous vein graft to the 1st diagonal, obtuse marginal branch, PDA.  · Patent lima to the distal left anterior descending artery    I certify that I was present for catheter insertion, catheter manipulation, angiography, and angiographic interpretation of this patient.    Procedure Log documented by Documenter: Brigid Russell RT and verified by Kuldeep Moore MD.    Date: 1/7/2020  Time: 10:02 AM     EKG  Results for orders placed or performed in visit on 02/09/24   IN OFFICE EKG 12-LEAD (to [place])    Collection Time: 02/09/24  8:36 AM   Result Value Ref Range    QRS Duration 76 ms    OHS QTC Calculation 414 ms    Narrative    Test Reason : I25.10,    Vent. Rate : 056 BPM     Atrial Rate : 056 BPM     P-R Int : 144 ms          QRS Dur : 076 ms      QT Int : 430 ms       P-R-T Axes : 033 -03 029 degrees     QTc Int : 414 ms    Sinus bradycardia  Possible Inferior infarct ,age undetermined  Cannot rule out Anterior infarct (cited on or before 30-DEC-2019)  Abnormal ECG  When compared with ECG of 30-DEC-2019 11:11,  No significant change was found  Confirmed by Kuldeep Moore MD ([id number]) on 3/9/2024 2:55:30 PM    Referred By:  LUCIANO           Confirmed By:Kuldeep Moore MD      Stress  No results found for this or any previous visit.             Assessment:       Atherosclerosis of native  coronary artery of native heart without angina pectoris  Status post coronary artery bypass no symptoms of angina    History of coronary artery bypass surgery- 2012 - 4 vessels - no heart attack  No symptoms of angina    Hyperlipidemia  On Repatha    Hypertension  Controlled on 50 mg of losartan    Silent myocardial ischemia  She had severe multivessel disease with no symptoms.  Will perform a pharmacological myocardial perfusion scan for surveillance purposes.       Plan:       Obtain an echocardiogram to assess her left ventricular wall thickness and wall motion.  Obtain a pharmacological myocardial perfusion scan.  A hepatic function panel will obtained.  She will be seen in the office in 3 months

## 2025-02-20 PROBLEM — I25.6 SILENT MYOCARDIAL ISCHEMIA: Status: ACTIVE | Noted: 2025-02-20

## 2025-02-20 NOTE — ASSESSMENT & PLAN NOTE
She had severe multivessel disease with no symptoms.  Will perform a pharmacological myocardial perfusion scan for surveillance purposes.

## 2025-03-13 ENCOUNTER — PATIENT MESSAGE (OUTPATIENT)
Dept: ADMINISTRATIVE | Facility: OTHER | Age: 75
End: 2025-03-13
Payer: MEDICARE

## 2025-03-27 ENCOUNTER — TELEPHONE (OUTPATIENT)
Dept: CARDIOLOGY | Facility: CLINIC | Age: 75
End: 2025-03-27
Payer: MEDICARE

## 2025-03-27 NOTE — TELEPHONE ENCOUNTER
----- Message from Dat sent at 3/27/2025 12:07 PM CDT -----  Regarding: appointment  Contact: patient  Type:  Patient Returning CallWho Called:patientWho Left Message for Patient:staffDoes the patient know what this is regarding?:can office schedule patient with MD Moore second week of May after May 12 and call patient and let her know of date?Would the patient rather a call back or a response via [facility]? [facility] [place] Call Back Number:[phone] Information:

## 2025-04-02 ENCOUNTER — PATIENT MESSAGE (OUTPATIENT)
Dept: CARDIOLOGY | Facility: CLINIC | Age: 75
End: 2025-04-02
Payer: MEDICARE

## 2025-06-02 ENCOUNTER — TELEPHONE (OUTPATIENT)
Dept: CARDIOLOGY | Facility: HOSPITAL | Age: 75
End: 2025-06-02

## 2025-06-03 ENCOUNTER — HOSPITAL ENCOUNTER (OUTPATIENT)
Dept: CARDIOLOGY | Facility: HOSPITAL | Age: 75
Discharge: HOME | End: 2025-06-03
Attending: INTERNAL MEDICINE
Payer: MEDICARE

## 2025-06-03 ENCOUNTER — HOSPITAL ENCOUNTER (OUTPATIENT)
Dept: RADIOLOGY | Facility: HOSPITAL | Age: 75
Discharge: HOME | End: 2025-06-03
Attending: INTERNAL MEDICINE
Payer: MEDICARE

## 2025-06-03 VITALS — BODY MASS INDEX: 32.39 KG/M2 | WEIGHT: 165 LBS | HEIGHT: 60 IN

## 2025-06-03 DIAGNOSIS — R00.1 BRADYCARDIA, SINUS: ICD-10-CM

## 2025-06-03 DIAGNOSIS — E78.2 MIXED HYPERLIPIDEMIA: ICD-10-CM

## 2025-06-03 DIAGNOSIS — I25.10 ATHEROSCLEROSIS OF NATIVE CORONARY ARTERY OF NATIVE HEART WITHOUT ANGINA PECTORIS: ICD-10-CM

## 2025-06-03 DIAGNOSIS — R79.89 ABNORMAL LIVER FUNCTION TESTS: ICD-10-CM

## 2025-06-03 LAB
AORTIC ROOT ANNULUS: 2.7 CM
AORTIC VALVE CUSP SEPERATION: 1.5 CM
APICAL FOUR CHAMBER EJECTION FRACTION: 65 %
AV INDEX (PROSTH): 0.84
AV MEAN GRADIENT: 3 MMHG
AV PEAK GRADIENT: 6 MMHG
AV VALVE AREA BY VELOCITY RATIO: 2.6 CM²
AV VALVE AREA: 2.6 CM²
AV VELOCITY RATIO: 0.83
BSA FOR ECHO PROCEDURE: 1.78 M2
CV ECHO LV RWT: 0.37 CM
CV PHARM DOSE: 0.4 MG
CV STRESS BASE HR: 46 BPM
DIASTOLIC BLOOD PRESSURE: 72 MMHG
DOP CALC AO PEAK VEL: 1.2 M/S
DOP CALC AO VTI: 26.3 CM
DOP CALC LVOT AREA: 3.1 CM2
DOP CALC LVOT DIAMETER: 2 CM
DOP CALC LVOT PEAK VEL: 1 M/S
DOP CALC LVOT STROKE VOLUME: 69.4 CM3
DOP CALCLVOT PEAK VEL VTI: 22.1 CM
E WAVE DECELERATION TIME: 201 MSEC
E/A RATIO: 1.19
E/E' RATIO: 10 M/S
ECHO LV POSTERIOR WALL: 0.8 CM (ref 0.6–1.1)
EJECTION FRACTION- HIGH: 65 %
EJECTION FRACTION: 65 %
END DIASTOLIC INDEX-HIGH: 153 ML/M2
END DIASTOLIC INDEX-LOW: 93 ML/M2
END SYSTOLIC INDEX-HIGH: 71 ML/M2
END SYSTOLIC INDEX-LOW: 31 ML/M2
FRACTIONAL SHORTENING: 39.5 % (ref 28–44)
INTERVENTRICULAR SEPTUM: 0.9 CM (ref 0.6–1.1)
IVRT: 90 MSEC
LEFT ATRIUM SIZE: 4.4 CM
LEFT INTERNAL DIMENSION IN SYSTOLE: 2.6 CM (ref 2.1–4)
LEFT VENTRICLE DIASTOLIC VOLUME INDEX: 47.67 ML/M2
LEFT VENTRICLE DIASTOLIC VOLUME: 82 ML
LEFT VENTRICLE END DIASTOLIC VOLUME APICAL 4 CHAMBER: 54.3 ML
LEFT VENTRICLE MASS INDEX: 66.4 G/M2
LEFT VENTRICLE SYSTOLIC VOLUME INDEX: 14 ML/M2
LEFT VENTRICLE SYSTOLIC VOLUME: 24 ML
LEFT VENTRICULAR INTERNAL DIMENSION IN DIASTOLE: 4.3 CM (ref 3.5–6)
LEFT VENTRICULAR MASS: 114.2 G
LV LATERAL E/E' RATIO: 8 M/S
LV SEPTAL E/E' RATIO: 13.7 M/S
LVED V (TEICH): 81.7 ML
LVES V (TEICH): 23.7 ML
LVOT MG: 2 MMHG
LVOT MV: 0.66 CM/S
MV PEAK A VEL: 0.81 M/S
MV PEAK E VEL: 0.96 M/S
MV STENOSIS PRESSURE HALF TIME: 47 MS
MV VALVE AREA P 1/2 METHOD: 4.68 CM2
NUC REST DIASTOLIC VOLUME INDEX: 54
NUC REST EJECTION FRACTION: 67
NUC REST SYSTOLIC VOLUME INDEX: 18
NUC STRESS DIASTOLIC VOLUME INDEX: 68
NUC STRESS EJECTION FRACTION: 72 %
NUC STRESS SYSTOLIC VOLUME INDEX: 19
OHS CV CPX 1 MINUTE RECOVERY HEART RATE: 65 BPM
OHS CV CPX 85 PERCENT MAX PREDICTED HEART RATE MALE: 123
OHS CV CPX MAX PREDICTED HEART RATE: 145
OHS CV CPX PATIENT IS FEMALE: 1
OHS CV CPX PATIENT IS MALE: 0
OHS CV CPX PEAK DIASTOLIC BLOOD PRESSURE: 75 MMHG
OHS CV CPX PEAK HEAR RATE: 65 BPM
OHS CV CPX PEAK RATE PRESSURE PRODUCT: 9555
OHS CV CPX PEAK SYSTOLIC BLOOD PRESSURE: 147 MMHG
OHS CV CPX PERCENT MAX PREDICTED HEART RATE ACHIEVED: 46
OHS CV CPX RATE PRESSURE PRODUCT PRESENTING: 6394
OHS CV INITIAL DOSE: 12 MCG/KG/MIN
OHS CV PEAK DOSE: 26.2 MCG/KG/MIN
OHS CV RV/LV RATIO: 0.77 CM
PISA TR MAX VEL: 2.3 M/S
RA PRESSURE ESTIMATED: 3 MMHG
RETIRED EF AND QEF - SEE NOTES: 53 %
RIGHT VENTRICLE DIASTOLIC BASEL DIMENSION: 3.3 CM
RIGHT VENTRICULAR END-DIASTOLIC DIMENSION: 3.28 CM
RV TB RVSP: 5 MMHG
SYSTOLIC BLOOD PRESSURE: 139 MMHG
TDI LATERAL: 0.12 M/S
TDI SEPTAL: 0.07 M/S
TDI: 0.1 M/S
TR MAX PG: 20 MMHG
TV REST PULMONARY ARTERY PRESSURE: 24 MMHG
Z-SCORE OF LEFT VENTRICULAR DIMENSION IN END DIASTOLE: -1.04
Z-SCORE OF LEFT VENTRICULAR DIMENSION IN END SYSTOLE: -1.01

## 2025-06-03 PROCEDURE — A9502 TC99M TETROFOSMIN: HCPCS | Performed by: INTERNAL MEDICINE

## 2025-06-03 PROCEDURE — 63600175 PHARM REV CODE 636 W HCPCS: Performed by: INTERNAL MEDICINE

## 2025-06-03 PROCEDURE — 93306 TTE W/DOPPLER COMPLETE: CPT

## 2025-06-03 PROCEDURE — 93018 CV STRESS TEST I&R ONLY: CPT | Mod: ,,, | Performed by: INTERNAL MEDICINE

## 2025-06-03 PROCEDURE — 78452 HT MUSCLE IMAGE SPECT MULT: CPT | Mod: 26,,, | Performed by: INTERNAL MEDICINE

## 2025-06-03 PROCEDURE — 78452 HT MUSCLE IMAGE SPECT MULT: CPT

## 2025-06-03 PROCEDURE — 93306 TTE W/DOPPLER COMPLETE: CPT | Mod: 26,,, | Performed by: INTERNAL MEDICINE

## 2025-06-03 PROCEDURE — 93016 CV STRESS TEST SUPVJ ONLY: CPT | Mod: ,,, | Performed by: INTERNAL MEDICINE

## 2025-06-03 RX ORDER — REGADENOSON 0.08 MG/ML
0.4 INJECTION, SOLUTION INTRAVENOUS ONCE
Status: COMPLETED | OUTPATIENT
Start: 2025-06-03 | End: 2025-06-03

## 2025-06-03 RX ADMIN — TETROFOSMIN 12 MILLICURIE: 1.38 INJECTION, POWDER, LYOPHILIZED, FOR SOLUTION INTRAVENOUS at 06:06

## 2025-06-03 RX ADMIN — REGADENOSON 0.4 MG: 0.08 INJECTION, SOLUTION INTRAVENOUS at 08:06

## 2025-06-03 RX ADMIN — TETROFOSMIN 26.2 MILLICURIE: 1.38 INJECTION, POWDER, LYOPHILIZED, FOR SOLUTION INTRAVENOUS at 08:06

## 2025-06-10 ENCOUNTER — OFFICE VISIT (OUTPATIENT)
Dept: CARDIOLOGY | Facility: CLINIC | Age: 75
End: 2025-06-10
Payer: MEDICARE

## 2025-06-10 ENCOUNTER — LAB VISIT (OUTPATIENT)
Dept: LAB | Facility: HOSPITAL | Age: 75
End: 2025-06-10
Attending: INTERNAL MEDICINE
Payer: MEDICARE

## 2025-06-10 VITALS
HEIGHT: 60 IN | BODY MASS INDEX: 32.93 KG/M2 | DIASTOLIC BLOOD PRESSURE: 60 MMHG | HEART RATE: 48 BPM | SYSTOLIC BLOOD PRESSURE: 118 MMHG | WEIGHT: 167.75 LBS | OXYGEN SATURATION: 96 %

## 2025-06-10 DIAGNOSIS — Z95.1 HISTORY OF CORONARY ARTERY BYPASS SURGERY: ICD-10-CM

## 2025-06-10 DIAGNOSIS — R00.1 BRADYCARDIA, SINUS: ICD-10-CM

## 2025-06-10 DIAGNOSIS — Z78.9 STATIN INTOLERANCE: ICD-10-CM

## 2025-06-10 DIAGNOSIS — K75.81 NASH (NONALCOHOLIC STEATOHEPATITIS): ICD-10-CM

## 2025-06-10 DIAGNOSIS — R00.1 BRADYCARDIA, SINUS: Primary | ICD-10-CM

## 2025-06-10 DIAGNOSIS — I25.10 ATHEROSCLEROSIS OF NATIVE CORONARY ARTERY OF NATIVE HEART WITHOUT ANGINA PECTORIS: ICD-10-CM

## 2025-06-10 DIAGNOSIS — E78.2 MIXED HYPERLIPIDEMIA: ICD-10-CM

## 2025-06-10 LAB
T4 FREE SERPL-MCNC: 0.71 NG/DL (ref 0.71–1.51)
TSH SERPL-ACNC: 1.38 UIU/ML (ref 0.34–5.6)

## 2025-06-10 PROCEDURE — 1160F RVW MEDS BY RX/DR IN RCRD: CPT | Mod: CPTII,S$GLB,, | Performed by: INTERNAL MEDICINE

## 2025-06-10 PROCEDURE — 99999 PR PBB SHADOW E&M-EST. PATIENT-LVL III: CPT | Mod: PBBFAC,,, | Performed by: INTERNAL MEDICINE

## 2025-06-10 PROCEDURE — 1126F AMNT PAIN NOTED NONE PRSNT: CPT | Mod: CPTII,S$GLB,, | Performed by: INTERNAL MEDICINE

## 2025-06-10 PROCEDURE — 84439 ASSAY OF FREE THYROXINE: CPT

## 2025-06-10 PROCEDURE — 1101F PT FALLS ASSESS-DOCD LE1/YR: CPT | Mod: CPTII,S$GLB,, | Performed by: INTERNAL MEDICINE

## 2025-06-10 PROCEDURE — 36415 COLL VENOUS BLD VENIPUNCTURE: CPT

## 2025-06-10 PROCEDURE — 1159F MED LIST DOCD IN RCRD: CPT | Mod: CPTII,S$GLB,, | Performed by: INTERNAL MEDICINE

## 2025-06-10 PROCEDURE — 99213 OFFICE O/P EST LOW 20 MIN: CPT | Mod: S$GLB,,, | Performed by: INTERNAL MEDICINE

## 2025-06-10 PROCEDURE — 3078F DIAST BP <80 MM HG: CPT | Mod: CPTII,S$GLB,, | Performed by: INTERNAL MEDICINE

## 2025-06-10 PROCEDURE — 4010F ACE/ARB THERAPY RXD/TAKEN: CPT | Mod: CPTII,S$GLB,, | Performed by: INTERNAL MEDICINE

## 2025-06-10 PROCEDURE — 84443 ASSAY THYROID STIM HORMONE: CPT

## 2025-06-10 PROCEDURE — 3074F SYST BP LT 130 MM HG: CPT | Mod: CPTII,S$GLB,, | Performed by: INTERNAL MEDICINE

## 2025-06-10 PROCEDURE — 3288F FALL RISK ASSESSMENT DOCD: CPT | Mod: CPTII,S$GLB,, | Performed by: INTERNAL MEDICINE

## 2025-06-10 NOTE — PROGRESS NOTES
Patient ID:  Elena Wells is a 75 y.o. female who presents with Coronary Artery Disease, Hyperlipidemia, Results (Stress & echo), Hypertension, and Fatigue      History of Present Illness    CHIEF COMPLAINT:  Patient presents today for follow up of low heart rate    CARDIOVASCULAR:  She reports heart rate consistently between 48-50. She experiences occasional near-syncope but denies dizziness, palpitations, LLE weakness, or palpitations described as racing. She has a history of CAD.    MEDICAL HISTORY:  She has non-alcoholic steatohepatitis (WATERS).    CURRENT MEDICATIONS:  She takes ASA, thyroid medication, losartan, and Repatha.    LABS:  GFR is 59.9 mL/min/1.73m2, creatinine is 1.0 mg/dL, and potassium is 4.9 mEq/L.    SOCIAL HISTORY:  She denies history of alcohol and tobacco use.      ROS:  Cardiovascular: -palpitations, +feelings of slow heart rate  Neurological: -dizziness, +lightheadedness           Past Medical History:   Diagnosis Date    Abnormal stress test     Allergy     CAD (coronary artery disease)     Cataract     Fatty liver     Gall stones     Hypercholesteremia     Hypertension     Hypothyroidism     Sleep apnea         Past Surgical History:   Procedure Laterality Date    APPENDECTOMY      CARDIAC CATHETERIZATION      CATARACT EXTRACTION, BILATERAL      CORONARY ANGIOGRAPHY Left 2020    Procedure: ANGIOGRAM, CORONARY ARTERY;  Surgeon: Kuldeep Moore MD;  Location: [facility] CATH/EP LAB;  Service: Cardiology;  Laterality: Left;    CORONARY ARTERY BYPASS GRAFT      [id number]    CORONARY BYPASS GRAFT ANGIOGRAPHY  2020    Procedure: Bypass graft study;  Surgeon: Kuldeep Moore MD;  Location: [facility] CATH/EP LAB;  Service: Cardiology;;    HYSTERECTOMY            Current Outpatient Medications   Medication Instructions    albuterol (PROVENTIL/VENTOLIN HFA) 90 mcg/actuation inhaler SMARTSI Puff(s) Via Inhaler 3 Times Daily PRN    aspirin (ECOTRIN) 325 MG EC tablet 1  "tablet, Daily    cetirizine (ZYRTEC) 10 MG tablet 1 tablet, Daily    levothyroxine (SYNTHROID) 50 MCG tablet TAKE ONE TABLET BY MOUTH ONCE A DAY    losartan (COZAAR) 50 MG tablet TAKE ONE TABLET BY MOUTH ONCE A DAY    REPATHA SURECLICK 140 mg, Subcutaneous, Every 14 days        Review of patient's allergies indicates:   Allergen Reactions    Codeine Other (See Comments)     Hypotension      Nalbuphine Other (See Comments)     Hypotension    Pravastatin      Other reaction(s): elevated liver enzymes    Ramipril      Other reaction(s): rash    Rosuvastatin      Other reaction(s): intolerance- elevated liver enzymes  Other reaction(s): increase liver enzymes    Simvastatin      Other reaction(s): rash  Other reaction(s): Unknown    Atorvastatin Rash     Other reaction(s): intolerance; couldn't sleep; elevated liver enzymes           Objective:     Vitals:    06/10/25 1407   BP: 118/60   BP Location: Left arm   Patient Position: Sitting   Pulse: (!) 48   SpO2: 96%   Weight: 76.1 kg (167 lb 12.3 oz)   Height: 5' (1.524 m)        Physical Exam    General: No acute distress.  Overweight  Eyes: EOMI. Sclerae anicteric.  HENT: Normocephalic. Atraumatic. Nares patent. Moist oral mucosa.  Cardiovascular: Regular rate, bradycardic. No murmurs. No rubs. No gallops. Normal S1, S2.  Respiratory: Normal respiratory effort. Clear to auscultation bilaterally. No rales. No rhonchi. No wheezing.  Musculoskeletal: No  obvious deformity.  Extremities: No lower extremity edema.  Neurological: Alert & oriented x3. No slurred speech. Normal gait.  Psychiatric: Normal mood. Normal affect. Good insight. Good judgment.  Skin: Warm. Dry. No rash.         CARDIAC PROFILE  No results found for: "BNP", "CPK", "CPKMB", "LDH", "TROPONINI", "TROPONINIHS"  CMP  Sodium   Date Value Ref Range Status   10/06/2022 140 136 - 145 mmol/L Final   05/06/2019 137 134 - 144 mmol/L      Potassium   Date Value Ref Range Status   10/06/2022 4.9 3.5 - 5.1 mmol/L " Final     Chloride   Date Value Ref Range Status   10/06/2022 105 95 - 110 mmol/L Final   05/06/2019 102 98 - 110 mmol/L      CO2   Date Value Ref Range Status   10/06/2022 27 23 - 29 mmol/L Final     Glucose   Date Value Ref Range Status   10/06/2022 108 70 - 110 mg/dL Final   05/06/2019 113 (H) 70 - 99 mg/dL      BUN   Date Value Ref Range Status   10/06/2022 14 8 - 23 mg/dL Final     Creatinine   Date Value Ref Range Status   10/06/2022 1.0 0.5 - 1.4 mg/dL Final   05/06/2019 1.20 0.60 - 1.40 mg/dL      Calcium   Date Value Ref Range Status   10/06/2022 9.9 8.7 - 10.5 mg/dL Final     Total Protein   Date Value Ref Range Status   10/06/2022 7.8 6.0 - 8.4 g/dL Final     Albumin   Date Value Ref Range Status   10/06/2022 3.7 3.5 - 5.2 g/dL Final   05/06/2019 3.7 3.1 - 4.7 g/dL      Total Bilirubin   Date Value Ref Range Status   10/06/2022 0.6 0.1 - 1.0 mg/dL Final     Comment:     For infants and newborns, interpretation of results should be based  on gestational age, weight and in agreement with clinical  observations.    Premature Infant recommended reference ranges:  Up to 24 hours.............<8.0 mg/dL  Up to 48 hours............<12.0 mg/dL  3-5 days..................<15.0 mg/dL  6-29 days.................<15.0 mg/dL       Alkaline Phosphatase   Date Value Ref Range Status   10/06/2022 185 (H) 55 - 135 U/L Final     AST   Date Value Ref Range Status   10/06/2022 83 (H) 10 - 40 U/L Final     ALT   Date Value Ref Range Status   10/06/2022 90 (H) 10 - 44 U/L Final     Anion Gap   Date Value Ref Range Status   10/06/2022 8 8 - 16 mmol/L Final     eGFR   Date Value Ref Range Status   10/06/2022 59.9 (A) >60 mL/min/1.73 m^2 Final      Lab Results   Component Value Date    CHOL 252 (H) 10/06/2022    CHOL 218 (H) 09/21/2021    CHOL 221 (H) 06/24/2020     Lab Results   Component Value Date    HDL 61 10/06/2022    HDL 54 09/21/2021    HDL 57 06/24/2020     Lab Results   Component Value Date    LDLCALC 166.6 (H)  10/06/2022    LDLCALC 142.8 09/21/2021    LDLCALC 139.8 06/24/2020     Lab Results   Component Value Date    TRIG 122 10/06/2022    TRIG 106 09/21/2021    TRIG 121 06/24/2020     Lab Results   Component Value Date    CHOLHDL 24.2 10/06/2022    CHOLHDL 24.8 09/21/2021    CHOLHDL 25.8 06/24/2020      Lab Results   Component Value Date    TSH 2.500 10/06/2022    FREET4 0.62 05/06/2019     Lab Results   Component Value Date    HGBA1C 6.0 10/12/2020     Lab Results   Component Value Date    WBC 7.74 10/06/2022    HGB 15.1 10/06/2022    HCT 47.7 10/06/2022    MCV 96 10/06/2022     10/06/2022        Results for orders placed during the hospital encounter of 06/03/25    Echo Saline Bubble? No    Interpretation Summary    Left Ventricle: The left ventricle is normal in size. Normal wall thickness. There is normal systolic function. Ejection fraction is approximately 65%. There is normal diastolic function.    Right Ventricle: The right ventricle is normal in size Wall thickness is normal. Systolic function is normal.    Left Atrium: The left atrium is mildly dilated    Tricuspid Valve: There is mild regurgitation.    IVC/SVC: Normal venous pressure at 3 mmHg.    The estimated pulmonary artery systolic pressure is 24 mmHg.     Results for orders placed during the hospital encounter of 01/02/20    Cardiac catheterization    Conclusion  · Ost RPDA lesion , 99% stenosed.  · Ost 1st Diag lesion , 80% stenosed.  · Ost 2nd Mrg to 2nd Mrg lesion , 100% stenosed.  · Prox LAD lesion , 50% stenosed.  · Estimated blood loss: <50 mL  · Three vessel coronary artery disease.  · Patent saphenous vein graft to the 1st diagonal, obtuse marginal branch, PDA.  · Patent lima to the distal left anterior descending artery    I certify that I was present for catheter insertion, catheter manipulation, angiography, and angiographic interpretation of this patient.    Procedure Log documented by Documenter: RT Minnie and verified by  Kuldeep Moore MD.    Date: 1/7/2020  Time: 10:02 AM       EKG  Results for orders placed or performed in visit on 02/09/24   IN OFFICE EKG 12-LEAD (to [place])    Collection Time: 02/09/24  8:36 AM   Result Value Ref Range    QRS Duration 76 ms    OHS QTC Calculation 414 ms    Narrative    Test Reason : I25.10,    Vent. Rate : 056 BPM     Atrial Rate : 056 BPM     P-R Int : 144 ms          QRS Dur : 076 ms      QT Int : 430 ms       P-R-T Axes : 033 -03 029 degrees     QTc Int : 414 ms    Sinus bradycardia  Possible Inferior infarct ,age undetermined  Cannot rule out Anterior infarct (cited on or before 30-DEC-2019)  Abnormal ECG  When compared with ECG of 30-DEC-2019 11:11,  No significant change was found  Confirmed by Kuldeep Moore MD ([id number]) on 3/9/2024 2:55:30 PM    Referred By:  LUCIANO           Confirmed By:Kuldeep Moore MD        Stress  Results for orders placed during the hospital encounter of 06/03/25    Nuclear Stress - Cardiology Interpreted    Interpretation Summary    Normal myocardial perfusion scan. There is no evidence of myocardial ischemia or infarction.    The gated perfusion images showed an ejection fraction of 67% at rest. The gated perfusion images showed an ejection fraction of 72% post stress. Normal ejection fraction is greater than 53%.    There is normal wall motion at rest and post-stress.    The ECG portion of the study is negative for ischemia.    The patient reported chest pain during the stress test.    There were no arrhythmias during stress.           Assessment/Plan:          1. Bradycardia, sinus    2. History of coronary artery bypass surgery- 2012 - 4 vessels - no heart attack    3. Mixed hyperlipidemia    4. Atherosclerosis of native coronary artery of native heart without angina pectoris    5. WATERS (nonalcoholic steatohepatitis)    6. Statin intolerance      Assessment & Plan    I25.10 Atherosclerotic heart disease of native coronary artery without angina  pectoris  R00.1 Bradycardia, unspecified  R55 Syncope and collapse  K75.81 Nonalcoholic steatohepatitis (WATERS)  N18.32 Chronic kidney disease, stage 3b  Z79.82 Long term (current) use of aspirin    PLAN SUMMARY:  - Ordered thyroid function tests (T4 and TSH) to rule out hypothyroidism as cause of bradycardia  - Continue aspirin  - Continue losartan  - Continue Repatha  - Continue thyroid medicine  - Consider changing ARB medication from losartan to valsartan  - Pacemaker not considered at this time  - Check MyChart for thyroid test results by next day  - Contact office if any problems arise or if thyroid test results are abnormal  - Follow up in 6 months    ATHEROSCLEROTIC HEART DISEASE (CAD):  - Explained the use of ARBs (like losartan and valsartan) for secondary prevention in CAD.  - Discussed the four types of medications typically used for patients with CAD: cholesterol medicine, beta-blocker (not used due to slow heart rate), aspirin, and ARB/ACE inhibitor.  - Considered changing ARB medication from losartan to valsartan due to potential effects on heart rate and potassium levels.  - Continued aspirin.  - Continued losartan.  - Continued Repatha.    BRADYCARDIA:  - Sinus bradycardia has been present for a long time with heart rate of 44-50 bpm, which increased to 65 bpm with medication during recent testing.  - Pacemaker not considered due to lack of severe symptoms.  - Patient to monitor blood pressure and heart rate at home, report any symptoms of dizziness, weakness, inability to walk, or shortness of breath.  - Ordered thyroid function tests (T4 and TSH) to rule out hypothyroidism as a cause of bradycardia.    THYROID MANAGEMENT:  - Continued thyroid medicine.  - Ordered thyroid function tests (T4 and TSH) to rule out hypothyroidism as a cause of bradycardia.  - Check MyChart for thyroid test results, which should be available by the next day.    FOLLOW-UP:  - Follow up in 6 months.  - Contact the office  if any problems arise or if thyroid test results are abnormal.           This note was generated with the assistance of ambient listening technology. Verbal consent was obtained by the patient and accompanying visitor(s) for the recording of patient appointment to facilitate this note. I attest to having reviewed and edited the generated note for accuracy, though some syntax or spelling errors may persist. Please contact the author of this note for any clarification.

## 2025-06-12 ENCOUNTER — TELEPHONE (OUTPATIENT)
Dept: CARDIOLOGY | Facility: CLINIC | Age: 75
End: 2025-06-12
Payer: MEDICARE

## 2025-06-12 NOTE — TELEPHONE ENCOUNTER
Copied from CRM #[id number]. Topic: General Inquiry - Patient Advice  >> Jun 12, 2025  9:28 AM Bryson wrote:  Type:  Needs Medical Advice    Who Called: pt     Best Call Back Number: [phone]    Additional Information: pt wants a callback to discuss getting her labs results sent to her pcp. Please call to discuss.

## 2025-06-13 ENCOUNTER — TELEPHONE (OUTPATIENT)
Dept: CARDIOLOGY | Facility: CLINIC | Age: 75
End: 2025-06-13
Payer: MEDICARE

## 2025-06-13 NOTE — TELEPHONE ENCOUNTER
Copied from CRM #[id number]. Topic: General Inquiry - Return Call  >> Jun 13, 2025  9:27 AM Bryson wrote:  Type:  Patient Returning Call    Who Called:pt     Who Left Message for Patient:ISELA TILLMAN    Does the patient know what this is regarding?:yes    Best Call Back Number: [phone]      Additional Information: pt st her results have not been faxed wants a call back to get an update. Please call to discuss.

## 2025-07-21 DIAGNOSIS — Z12.31 ENCOUNTER FOR SCREENING MAMMOGRAM FOR MALIGNANT NEOPLASM OF BREAST: Primary | ICD-10-CM

## (undated) DEVICE — SHEATH PINNACLE 6FRX10CM W/GUIDEWIRE

## (undated) DEVICE — CATHETER DIAGNOSTIC DXTERITY 6FR JR 3.5

## (undated) DEVICE — CATHETER EXPO MLTPK 6FR 100X110CM